# Patient Record
Sex: MALE | Race: WHITE | NOT HISPANIC OR LATINO | URBAN - METROPOLITAN AREA
[De-identification: names, ages, dates, MRNs, and addresses within clinical notes are randomized per-mention and may not be internally consistent; named-entity substitution may affect disease eponyms.]

---

## 2024-02-23 ENCOUNTER — HOSPITAL ENCOUNTER (OUTPATIENT)
Dept: RADIOLOGY | Facility: CLINIC | Age: 44
Discharge: HOME | End: 2024-02-23
Payer: COMMERCIAL

## 2024-02-23 ENCOUNTER — OFFICE VISIT (OUTPATIENT)
Dept: ORTHOPEDIC SURGERY | Facility: CLINIC | Age: 44
End: 2024-02-23
Payer: COMMERCIAL

## 2024-02-23 DIAGNOSIS — M25.571 BILATERAL ANKLE JOINT PAIN: ICD-10-CM

## 2024-02-23 DIAGNOSIS — M76.72 PERONEAL TENDINITIS OF BOTH LOWER LEGS: ICD-10-CM

## 2024-02-23 DIAGNOSIS — M25.572 BILATERAL ANKLE JOINT PAIN: ICD-10-CM

## 2024-02-23 DIAGNOSIS — M76.71 PERONEAL TENDINITIS OF BOTH LOWER LEGS: ICD-10-CM

## 2024-02-23 PROCEDURE — 73610 X-RAY EXAM OF ANKLE: CPT | Mod: 50

## 2024-02-23 PROCEDURE — 99213 OFFICE O/P EST LOW 20 MIN: CPT | Performed by: ORTHOPAEDIC SURGERY

## 2024-02-23 PROCEDURE — 1036F TOBACCO NON-USER: CPT | Performed by: ORTHOPAEDIC SURGERY

## 2024-02-23 PROCEDURE — 73610 X-RAY EXAM OF ANKLE: CPT | Mod: BILATERAL PROCEDURE | Performed by: RADIOLOGY

## 2024-02-23 PROCEDURE — 99203 OFFICE O/P NEW LOW 30 MIN: CPT | Performed by: ORTHOPAEDIC SURGERY

## 2024-02-23 NOTE — PROGRESS NOTES
History: Noe is here for his ankles.  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  There was a subsequent infection with cleanout and wound VAC.  He has gone on to heal well but has developed more significant pain on the outside of both ankles.  He has not had any surgery on the right ankle.  He is having more pain and trouble doing daily activities including walking and sports.    Past medical history: Multiple  Medications: Multiple  Allergies: Naproxen, Bactrim, vancomycin    Please refer to the intake H&P regarding the patient's review of systems, family history and social history as was done today    HEENT: Normal  Lungs: Clear to auscultation  Heart: RRR  Abdomen: Soft, nontender  Skin: clear  Extremity: He has tenderness around the peroneal tendons on both sides.  Previous scar on the left ankle is actually fairly well-healed.  He has trouble with eversion maneuvers due to his prior fusion with only mild pain.  He has 5-5 strength in all groups tested.  There is an obvious flatfoot deformity upon standing which corrects only slightly on the right and not on the left.  He has some numbness tingling around the incision on the left side as well.  Contralateral exam is normal for strength, motion, stability and neurovascular assessment.    Radiographs: X-rays today show a stable appearing subtalar fusion with single screw on the left.  Ankle mortise on both sides is maintained.    Assessment: Bilateral peroneal tendinitis with prior left subtalar fusion and history of bilateral talar calcaneal coalitions    Plan: He has a complicated history especially on the left side with his surgery and infection.  He developing more peroneal tendinitis and also has a bit of a flatfoot deformity.  I have urged him to get into a custom orthotic to help with his arches and alignment as this may significantly alleviate some of his discomfort.  He has never had MRIs of his  ankles since being diagnosed with the peroneal tendinitis on ultrasound and we will order the MRIs on both sides as well.  He is going to follow-up with our foot and ankle specialist to go through results and consider further options.  I would certainly urge him to avoid surgery if at all possible however they can discuss options depending on his clinical response and MRI results.  All questions were answered today with the patient.    This note was generated with voice recognition software and may contain grammatical errors.

## 2024-03-26 ENCOUNTER — APPOINTMENT (OUTPATIENT)
Dept: RADIOLOGY | Facility: CLINIC | Age: 44
End: 2024-03-26
Payer: COMMERCIAL

## 2024-04-10 ENCOUNTER — APPOINTMENT (OUTPATIENT)
Dept: ORTHOPEDIC SURGERY | Facility: CLINIC | Age: 44
End: 2024-04-10
Payer: COMMERCIAL

## 2024-04-23 ENCOUNTER — TRANSCRIBE ORDERS (OUTPATIENT)
Dept: ADMINISTRATIVE | Age: 44
End: 2024-04-23

## 2024-04-23 DIAGNOSIS — M76.72 PERONEAL TENDINITIS OF LEFT LOWER EXTREMITY: ICD-10-CM

## 2024-04-23 DIAGNOSIS — M76.71 PERONEAL TENDINITIS OF RIGHT LOWER EXTREMITY: Primary | ICD-10-CM

## 2024-05-06 ENCOUNTER — HOSPITAL ENCOUNTER (OUTPATIENT)
Dept: MRI IMAGING | Age: 44
Discharge: HOME | End: 2024-05-08
Attending: ORTHOPAEDIC SURGERY
Payer: COMMERCIAL

## 2024-05-06 DIAGNOSIS — M76.71 PERONEAL TENDINITIS OF RIGHT LOWER EXTREMITY: ICD-10-CM

## 2024-05-06 DIAGNOSIS — M76.72 PERONEAL TENDINITIS OF LEFT LOWER EXTREMITY: ICD-10-CM

## 2024-05-06 PROCEDURE — 73721 MRI JNT OF LWR EXTRE W/O DYE: CPT

## 2024-05-08 ENCOUNTER — OFFICE VISIT (OUTPATIENT)
Dept: ORTHOPEDIC SURGERY | Facility: CLINIC | Age: 44
End: 2024-05-08
Payer: COMMERCIAL

## 2024-05-08 VITALS — HEIGHT: 69 IN | BODY MASS INDEX: 25.18 KG/M2 | WEIGHT: 170 LBS

## 2024-05-08 DIAGNOSIS — M76.71 PERONEAL TENDINITIS OF BOTH LOWER LEGS: ICD-10-CM

## 2024-05-08 DIAGNOSIS — Q66.89 TARSAL COALITION OF BOTH FEET: Primary | ICD-10-CM

## 2024-05-08 DIAGNOSIS — M76.72 PERONEAL TENDINITIS OF BOTH LOWER LEGS: ICD-10-CM

## 2024-05-08 PROCEDURE — 99214 OFFICE O/P EST MOD 30 MIN: CPT | Performed by: ORTHOPAEDIC SURGERY

## 2024-05-08 PROCEDURE — 1036F TOBACCO NON-USER: CPT | Performed by: ORTHOPAEDIC SURGERY

## 2024-05-08 ASSESSMENT — PAIN SCALES - GENERAL: PAINLEVEL_OUTOF10: 4

## 2024-05-08 ASSESSMENT — PAIN DESCRIPTION - DESCRIPTORS: DESCRIPTORS: ACHING;THROBBING

## 2024-05-08 ASSESSMENT — PAIN - FUNCTIONAL ASSESSMENT: PAIN_FUNCTIONAL_ASSESSMENT: 0-10

## 2024-05-08 NOTE — PROGRESS NOTES
43-year-old male here for bilateral ankle pain.  Longstanding issue with both ankles.  Had a subtalar fusion on the left for tarsal coalition 2010 in [place].  2 years later had a peroneal tendon repair.  Had some sort of wound complication with the wound VAC.  Did okay for a few years until before 5 years ago developed pain over the lateral ankle bilaterally.  Was seen at the [facility].  Had ultrasound and MRI.  Did cortisone shots and PT.  Never really got better.  Feels like the right side wants to give out.  Non-smoker.  No diabetes.    On exam:  WD/WN athletic male  A+O X3  NAD  No lymphedema  Inspection of both feet and ankles show symmetric slight planus arches.   Able to STR bilaterally.  No inversion movement of the calcaneus.  5/5 strength in all 4 planes.  Tender over left peroneal tendon behind the fibula distally.  Has healed posterior lateral scar and sinus Tarsi scar on the left.  Minimal subtalar motion right hindfoot.  Point tender sinus Tarsi behind peroneals.  Sensation intact to LT.   Good pulses.   Stable anterior drawer.  No peroneal subluxation.    (-) Walter.     I personally reviewed the following radiographic exams: Nonweightbearing x-rays of both ankles shows previous subtalar fusion with screw on the left.  No acute changes bilaterally.  MRI report from outside facility recently shows apparent split tear of the left peroneal tendon distally with subtalar fusion.  Normal right ankle MRI.  No comment on subtalar coalition.    Assessment: Split tear left distal peroneal tendon status post subtalar fusion.  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    Plan: Discussed nonoperative and operative options in detail.   Risk and benefits discussed in detail. All questions answered today.  Recovery timeline and expectations discussed in detail.  Would like to review these MRIs in person.  We can consider surgical option for the peroneal tendon.  Would likely need a subtalar fusion on the right  based on clinical exam but would like to review the MRI.  Consider CT scan.

## 2024-05-22 ENCOUNTER — OFFICE VISIT (OUTPATIENT)
Dept: ORTHOPEDIC SURGERY | Facility: CLINIC | Age: 44
End: 2024-05-22
Payer: COMMERCIAL

## 2024-05-22 DIAGNOSIS — Q66.89 TARSAL COALITION OF BOTH FEET: Primary | ICD-10-CM

## 2024-05-22 DIAGNOSIS — M76.70 PERONEAL TENDINITIS, UNSPECIFIED LATERALITY: ICD-10-CM

## 2024-05-22 DIAGNOSIS — M25.571 SINUS TARSI SYNDROME OF BOTH ANKLES: ICD-10-CM

## 2024-05-22 DIAGNOSIS — M25.572 SINUS TARSI SYNDROME OF BOTH ANKLES: ICD-10-CM

## 2024-05-22 PROCEDURE — 20605 DRAIN/INJ JOINT/BURSA W/O US: CPT | Performed by: ORTHOPAEDIC SURGERY

## 2024-05-22 PROCEDURE — 99214 OFFICE O/P EST MOD 30 MIN: CPT | Performed by: ORTHOPAEDIC SURGERY

## 2024-05-22 RX ADMIN — METHYLPREDNISOLONE ACETATE 40 MG: 40 INJECTION, SUSPENSION INTRA-ARTICULAR; INTRALESIONAL; INTRAMUSCULAR; SOFT TISSUE at 09:25

## 2024-05-22 RX ADMIN — LIDOCAINE HYDROCHLORIDE 1 ML: 20 INJECTION, SOLUTION INFILTRATION; PERINEURAL at 09:25

## 2024-05-22 NOTE — PROGRESS NOTES
Returns for both ankles.  Has had his MRIs uploaded to PACS.    Exam: Point tenderness sinus Tarsi right hindfoot.  Tender over left ankle peroneal tendons and sinus Tarsi.    I personally reviewed the following radiographic exams: MRI of right hindfoot shows middle facet coalition.  Some edema along the posterior facet.  Normal peroneal tendons.  MRI left ankle shows irregularity postsurgical change of peroneal tendons.  Metal artifact across subtalar joint with apparent intact posterior facet.    Assessment: Right tarsal coalition with subtalar changes.  Possible nonunion left subtalar joint with peroneal tendon dysfunction.    Plan: Discussed nonoperative and operative options in detail.   Risk and benefits discussed in detail. All questions answered today.  Recovery timeline and expectations discussed in detail.  Discussed injection right subtalar joint for diagnostic therapeutic purposes.  Consider subtalar fusion in the future.  Recommend CT scan of left hindfoot for better bony definition of the subtalar joint.  Discussed possible exploration peroneal tendons and possible revision fusion subtalar joint.  After informed consent under sterile conditions the right subtalar joint was injected with a combination of  1cc 2% lidocaine and 40mg Methylprednisolone.  Risks and benefits were discussed in detail.    Patient ID: Noe Avilez is a 43 y.o. male.    M Inj/Asp: R subtalar on 5/22/2024 9:25 AM  Indications: pain  Details: 22 G needle, anterior approach  Medications: 40 mg methylPREDNISolone acetate 40 mg/mL; 1 mL lidocaine 20 mg/mL (2 %)  Procedure, treatment alternatives, risks and benefits explained, specific risks discussed. Consent was given by the patient. Immediately prior to procedure a time out was called to verify the correct patient, procedure, equipment, support staff and site/side marked as required. Patient was prepped and draped in the usual sterile fashion.

## 2024-05-23 RX ORDER — LIDOCAINE HYDROCHLORIDE 20 MG/ML
1 INJECTION, SOLUTION INFILTRATION; PERINEURAL
Status: COMPLETED | OUTPATIENT
Start: 2024-05-22 | End: 2024-05-22

## 2024-05-23 RX ORDER — METHYLPREDNISOLONE ACETATE 40 MG/ML
40 INJECTION, SUSPENSION INTRA-ARTICULAR; INTRALESIONAL; INTRAMUSCULAR; SOFT TISSUE
Status: COMPLETED | OUTPATIENT
Start: 2024-05-22 | End: 2024-05-22

## 2024-06-05 ENCOUNTER — HOSPITAL ENCOUNTER (OUTPATIENT)
Dept: RADIOLOGY | Facility: CLINIC | Age: 44
Discharge: HOME | End: 2024-06-05
Payer: COMMERCIAL

## 2024-06-05 DIAGNOSIS — Q66.89 TARSAL COALITION OF BOTH FEET: ICD-10-CM

## 2024-06-05 PROCEDURE — 73700 CT LOWER EXTREMITY W/O DYE: CPT | Mod: LEFT SIDE | Performed by: INTERNAL MEDICINE

## 2024-06-05 PROCEDURE — 73700 CT LOWER EXTREMITY W/O DYE: CPT | Mod: LT

## 2024-06-12 ENCOUNTER — APPOINTMENT (OUTPATIENT)
Dept: ORTHOPEDIC SURGERY | Facility: CLINIC | Age: 44
End: 2024-06-12
Payer: COMMERCIAL

## 2024-06-12 DIAGNOSIS — M76.72 PERONEAL TENDINITIS OF BOTH LOWER LEGS: Primary | ICD-10-CM

## 2024-06-12 DIAGNOSIS — Q66.89 TARSAL COALITION OF BOTH FEET: ICD-10-CM

## 2024-06-12 DIAGNOSIS — M76.71 PERONEAL TENDINITIS OF BOTH LOWER LEGS: Primary | ICD-10-CM

## 2024-06-12 PROCEDURE — 1036F TOBACCO NON-USER: CPT | Performed by: ORTHOPAEDIC SURGERY

## 2024-06-12 PROCEDURE — 99214 OFFICE O/P EST MOD 30 MIN: CPT | Performed by: ORTHOPAEDIC SURGERY

## 2024-06-12 RX ORDER — CEFAZOLIN SODIUM 2 G/100ML
2 INJECTION, SOLUTION INTRAVENOUS ONCE
OUTPATIENT
Start: 2024-06-12 | End: 2024-06-12

## 2024-06-12 RX ORDER — SODIUM CHLORIDE, SODIUM LACTATE, POTASSIUM CHLORIDE, CALCIUM CHLORIDE 600; 310; 30; 20 MG/100ML; MG/100ML; MG/100ML; MG/100ML
100 INJECTION, SOLUTION INTRAVENOUS CONTINUOUS
OUTPATIENT
Start: 2024-06-12

## 2024-06-12 NOTE — PROGRESS NOTES
Returns for CT scan of left ankle.  Got minimal relief with the injection last visit on the right.    Exam: Unchanged.    I personally reviewed the following radiographic exams: Left ankle shows posterior facet fusion at the lateral process across the screw fixation site.  Still intact posterior facet medially.  Solid middle facet fusion/coalition.    Assessment: Right middle facet coalition.  Left subtalar fusion with distal peroneal tendon tear.    Plan: Discussed nonoperative and operative options in detail.   Risk and benefits discussed in detail. All questions answered today.  Recovery timeline and expectations discussed in detail.  Discussed possible subtalar fusion on the right in the future.  Discussed peroneal tendon repair and probable tenodesis with hardware removal from the left heel.  Discussed postop recovery.  Left peroneal tendon repair possible tenodesis 82650/23918.  Left heel hard removal 20680.  General and regional.  Universal hardware removal set.  C arm.  Crutches.  30 minutes.

## 2024-07-01 PROBLEM — M76.72 PERONEAL TENDINITIS OF BOTH LOWER LEGS: Status: ACTIVE | Noted: 2024-06-12

## 2024-07-01 PROBLEM — M76.71 PERONEAL TENDINITIS OF BOTH LOWER LEGS: Status: ACTIVE | Noted: 2024-06-12

## 2024-07-01 PROBLEM — Q66.89: Status: ACTIVE | Noted: 2024-06-12

## 2024-07-23 ENCOUNTER — CLINICAL SUPPORT (OUTPATIENT)
Dept: PREADMISSION TESTING | Facility: HOSPITAL | Age: 44
End: 2024-07-23
Payer: COMMERCIAL

## 2024-07-23 RX ORDER — IBUPROFEN 400 MG/1
400 TABLET ORAL EVERY 6 HOURS PRN
COMMUNITY

## 2024-07-25 ENCOUNTER — PRE-ADMISSION TESTING (OUTPATIENT)
Dept: PREADMISSION TESTING | Facility: HOSPITAL | Age: 44
End: 2024-07-25
Payer: COMMERCIAL

## 2024-07-25 ENCOUNTER — LAB (OUTPATIENT)
Dept: LAB | Facility: LAB | Age: 44
End: 2024-07-25
Payer: COMMERCIAL

## 2024-07-25 VITALS
TEMPERATURE: 97 F | WEIGHT: 174.38 LBS | OXYGEN SATURATION: 100 % | RESPIRATION RATE: 16 BRPM | HEART RATE: 62 BPM | BODY MASS INDEX: 25.83 KG/M2 | SYSTOLIC BLOOD PRESSURE: 132 MMHG | HEIGHT: 69 IN | DIASTOLIC BLOOD PRESSURE: 85 MMHG

## 2024-07-25 DIAGNOSIS — M76.71 PERONEAL TENDINITIS OF BOTH LOWER LEGS: ICD-10-CM

## 2024-07-25 DIAGNOSIS — M76.72 PERONEAL TENDINITIS OF BOTH LOWER LEGS: ICD-10-CM

## 2024-07-25 DIAGNOSIS — Q66.89 TARSAL COALITION OF BOTH FEET: ICD-10-CM

## 2024-07-25 DIAGNOSIS — Z01.818 PREOP TESTING: Primary | ICD-10-CM

## 2024-07-25 DIAGNOSIS — Z01.818 PREOP TESTING: ICD-10-CM

## 2024-07-25 LAB
ANION GAP SERPL CALC-SCNC: 13 MMOL/L (ref 10–20)
BASOPHILS # BLD AUTO: 0.04 X10*3/UL (ref 0–0.1)
BASOPHILS NFR BLD AUTO: 0.7 %
BUN SERPL-MCNC: 14 MG/DL (ref 6–23)
CALCIUM SERPL-MCNC: 9.1 MG/DL (ref 8.6–10.3)
CHLORIDE SERPL-SCNC: 101 MMOL/L (ref 98–107)
CO2 SERPL-SCNC: 26 MMOL/L (ref 21–32)
CREAT SERPL-MCNC: 1.09 MG/DL (ref 0.5–1.3)
EGFRCR SERPLBLD CKD-EPI 2021: 85 ML/MIN/1.73M*2
EOSINOPHIL # BLD AUTO: 0.25 X10*3/UL (ref 0–0.7)
EOSINOPHIL NFR BLD AUTO: 4.3 %
ERYTHROCYTE [DISTWIDTH] IN BLOOD BY AUTOMATED COUNT: 13 % (ref 11.5–14.5)
GLUCOSE SERPL-MCNC: 93 MG/DL (ref 74–99)
HCT VFR BLD AUTO: 43.8 % (ref 41–52)
HGB BLD-MCNC: 14.7 G/DL (ref 13.5–17.5)
IMM GRANULOCYTES # BLD AUTO: 0.02 X10*3/UL (ref 0–0.7)
IMM GRANULOCYTES NFR BLD AUTO: 0.3 % (ref 0–0.9)
LYMPHOCYTES # BLD AUTO: 1.59 X10*3/UL (ref 1.2–4.8)
LYMPHOCYTES NFR BLD AUTO: 27.1 %
MCH RBC QN AUTO: 29.1 PG (ref 26–34)
MCHC RBC AUTO-ENTMCNC: 33.6 G/DL (ref 32–36)
MCV RBC AUTO: 87 FL (ref 80–100)
MONOCYTES # BLD AUTO: 0.51 X10*3/UL (ref 0.1–1)
MONOCYTES NFR BLD AUTO: 8.7 %
NEUTROPHILS # BLD AUTO: 3.46 X10*3/UL (ref 1.2–7.7)
NEUTROPHILS NFR BLD AUTO: 58.9 %
NRBC BLD-RTO: 0 /100 WBCS (ref 0–0)
PLATELET # BLD AUTO: 287 X10*3/UL (ref 150–450)
POTASSIUM SERPL-SCNC: 4.1 MMOL/L (ref 3.5–5.3)
RBC # BLD AUTO: 5.05 X10*6/UL (ref 4.5–5.9)
SODIUM SERPL-SCNC: 136 MMOL/L (ref 136–145)
WBC # BLD AUTO: 5.9 X10*3/UL (ref 4.4–11.3)

## 2024-07-25 PROCEDURE — 85025 COMPLETE CBC W/AUTO DIFF WBC: CPT

## 2024-07-25 PROCEDURE — 80048 BASIC METABOLIC PNL TOTAL CA: CPT

## 2024-07-25 PROCEDURE — 36415 COLL VENOUS BLD VENIPUNCTURE: CPT

## 2024-07-25 ASSESSMENT — ENCOUNTER SYMPTOMS
NEUROLOGICAL NEGATIVE: 1
CONSTITUTIONAL NEGATIVE: 1
ENDOCRINE NEGATIVE: 1
EYES NEGATIVE: 1
GASTROINTESTINAL NEGATIVE: 1
ARTHRALGIAS: 1
RESPIRATORY NEGATIVE: 1
CARDIOVASCULAR NEGATIVE: 1
NECK NEGATIVE: 1

## 2024-07-25 NOTE — CPM/PAT NURSE NOTE
CPM/PAT Nurse Note      Name: Noe Avilez (Noe Avilez)  /Age: 1980/44 y.o.       Past Medical History:   Diagnosis Date    Anxiety     mproved per patient    Flat foot     Fracture of ankle     HLD (hyperlipidemia)     Joint pain     bilateral ankles    Peroneal tendonitis     bilateral LE's    Tarsal coalition of both feet     Vitamin D deficiency     not on supplement       Past Surgical History:   Procedure Laterality Date    ANKLE FRACTURE SURGERY Left 2009    fusion of left ankle joint    LEG TENDON SURGERY Left 2011    ruptured tendon repair    LIPOMA RESECTION  2024    chest    VASECTOMY         Patient  reports being sexually active and has had partner(s) who are female. He reports using the following method of birth control/protection: Surgical.    Family History   Problem Relation Name Age of Onset    Other (HLD) Mother      Sarcoidosis Father      No Known Problems Sister      Cancer Maternal Grandfather Edi Avilez     Cancer Mother's Sister Kassi García        Allergies   Allergen Reactions    Naproxen GI Upset    Sulfamethoxazole-Trimethoprim Unknown     PT HAD A MIGRAINE    Vancomycin Hives and Itching       Prior to Admission medications    Medication Sig Start Date End Date Taking? Authorizing Provider   ibuprofen 400 mg tablet Take 1 tablet (400 mg) by mouth every 6 hours if needed for moderate pain (4 - 6).    Historical Provider, MD   loratadine (Claritin) 5 mg chewable tablet Chew 1 tablet (5 mg) if needed for allergies.    Historical Provider, MD ALLISON BLEVINS     DASI Risk Score    No data to display       Caprini DVT Assessment    No data to display       Modified Frailty Index    No data to display       CHADS2 Stroke Risk         N/A 3 to 100%: High Risk   2 to < 3%: Medium Risk   0 to < 2%: Low Risk     Last Change: N/A          This score determines the patient's risk of having a stroke if the patient has atrial fibrillation.        This score is not applicable to  this patient. Components are not calculated.          Revised Cardiac Risk Index    No data to display       Apfel Simplified Score    No data to display       Risk Analysis Index Results This Encounter    No data found in the last 1 encounters.         Nurse Plan of Action:   .gen

## 2024-07-25 NOTE — CPM/PAT H&P
[facility]/PAT Evaluation       Name: Noe Avilez (Noe Avilez)  /Age: 1980/44 y.o.     In-Person         Date of Consult: 24    Referring Provider: Dr. Foley     Date, Surgery, and Length: 24, left ankle tendon repair, with left lower extremity hardware removal, 90 minutes      [facility] presents to [facility] for perioperative risk assessment prior to scheduled surgery. Patient presents with right middle facet coalition. Left subtalar fusion with distal peroneal tendon tear. He initially presented with  bilateral ankle pain. Longstanding issue with both ankles. Had a subtalar fusion on the left for tarsal coalition  in [place]. 2 years later had a peroneal tendon repair. Had some sort of wound complication with the wound VAC. Did okay for a few years until before 5 years ago developed pain over the lateral ankle bilaterally. Was seen at the [facility]. Had ultrasound and MRI. Did cortisone shots and PT. Never really got better. Feels like the right side wants to give out.     This note was created in part upon personal review of patient's medical records.      Pt denies any past history of anesthetic complications such as PONV, awareness, prolonged sedation, dental damage, aspiration, cardiac arrest, difficult intubation, difficult I.V. access or unexpected hospital admissions.  No history of malignant hyperthermia and or pseudocholinesterase deficiency.    No history of blood transfusions.    The patient is not a [facility] and will accept blood and blood products if medically indicated. Type and screen not sent.      Past Medical History:   Diagnosis Date    Anxiety     mproved per patient    Flat foot     Fracture of ankle     HLD (hyperlipidemia)     Joint pain     bilateral ankles    Peroneal tendonitis     bilateral LE's    Tarsal coalition of both feet     Vitamin D deficiency     not on supplement       Past Surgical History:   Procedure Laterality Date    ANKLE FRACTURE SURGERY  "Left 2009    fusion of left ankle joint    LEG TENDON SURGERY Left 2011    ruptured tendon repair    LIPOMA RESECTION  01/2024    chest    VASECTOMY  2011       Patient  reports being sexually active and has had partner(s) who are female. He reports using the following method of birth control/protection: Surgical.    Family History   Problem Relation Name Age of Onset    Other (HLD) Mother      Sarcoidosis Father      No Known Problems Sister      Cancer Maternal Grandfather Edi Avilez     Cancer Mother's Sister Kassi García        Allergies   Allergen Reactions    Naproxen GI Upset    Sulfamethoxazole-Trimethoprim Unknown     PT HAD A MIGRAINE    Vancomycin Hives and Itching     Social History     Tobacco Use   Smoking Status Never   Smokeless Tobacco Never     Social History     Substance and Sexual Activity   Alcohol Use Not Currently    Comment: 1 drink maybe every few weeks     Social History     Substance and Sexual Activity   Drug Use Never       Current Outpatient Medications:     ibuprofen 400 mg tablet, Take 1 tablet (400 mg) by mouth every 6 hours if needed for moderate pain (4 - 6)., Disp: , Rfl:     loratadine (Claritin) 5 mg chewable tablet, Chew 1 tablet (5 mg) if needed for allergies., Disp: , Rfl:        PAT ROS:   Constitutional:   neg    Neuro/Psych:   neg    Eyes:   neg    Ears:   neg    Nose:   neg    Mouth:    Upper permanent retainer  Throat:   neg    Neck:   neg    Cardio:   neg    Respiratory:   neg    Endocrine:   neg    GI:   neg    :   neg    Musculoskeletal:    arthralgias  Hematologic:   neg    Skin:  neg        Physical Exam  Vitals reviewed. Physical exam within normal limits.          PAT AIRWAY:   Airway:     Mallampati::  II    Neck ROM::  Full   No broken teeth, no dentures and no missing teeth        Visit Vitals  /85   Pulse 62   Temp 36.1 °C (97 °F)   Resp 16   Ht 1.753 m (5' 9\")   Wt 79.1 kg (174 lb 6.1 oz)   SpO2 100%   BMI 25.75 kg/m²   Smoking Status Never   BSA " 1.96 m²       Assessment and Plan:     Patient is a 44 year old generally healthy  male scheduled for left ankle tendon repair with left lower extremity hardware removal on 8/1/24 with Dr. Foley.    Patient is at acceptable risk to proceed with planned surgical procedure. Further cardiac risk stratification deferred at this time.This patient is a low risk candidate undergoing moderate risk procedure, patient is medically optimized for surgery.    Plan    Cardiovascular:    Patient denies any chest pain, tightness, heaviness, pressure, radiating pain, palpitations, irregular heartbeats, lightheadedness, cough, congestion, shortness of breath, KOLB, PND, near syncope, weight loss or gain.    Good  functional capacity  Functional 4 Mets. Patient denies SOB walking up 2 flights of stairs    EKG in PAT not indicated.    RCRI: 0  Risk of Mace: 3.9%    Pulm:  Known or suspected LARA is considered an independent risk factor for difficult mask ventilation, difficult intubation or both.  Increased vigilance is recommended with the use of narcotics due to an increased risk for opioid induced respiratory depression.  The patient may benefit from continuous pulse oximetry to monitor for hypoxic events until baseline Sp02 is normal on room air.    Stop Bang= 1, male    Renal/endo:  Recommendations to avoid nephrotoxic drugs and carefully monitor fluid status to maintain euvolemia. Use dose adjusted medications as needed for the underlying level of renal function.    Heme:  Patient instructed to ambulate as soon as possible postoperatively to decrease thromboembolic risk.    Initiate mechanical DVT prophylaxis as soon as possible and initiate chemical prophylaxis when deemed safe from a bleeding standpoint post surgery.    Caprini= 4      Risk assessment complete.  Patient is scheduled for a low surgical risk procedure. He is considered medically optimized for the planned procedure.    Labs/testing obtained in PAT on  7/25/24: CBC,BMP    Lab Results   Component Value Date    WBC 5.9 07/25/2024    HGB 14.7 07/25/2024    HCT 43.8 07/25/2024    MCV 87 07/25/2024     07/25/2024     Lab Results   Component Value Date    GLUCOSE 93 07/25/2024    CALCIUM 9.1 07/25/2024     07/25/2024    K 4.1 07/25/2024    CO2 26 07/25/2024     07/25/2024    BUN 14 07/25/2024    CREATININE 1.09 07/25/2024         Follow up: none      Preoperative medication instructions were provided and reviewed with the patient.  Any additional testing or evaluation was explained to the patient.  Nothing by mouth instructions were discussed and patient's questions were answered prior to conclusion to this encounter.  Patient verbalized understanding of preoperative instructions given in preadmission testing; discharge instructions available in EMR.    This note was dictated with speech recognition.  Minor errors may have been detected during use of speech recognition.

## 2024-07-25 NOTE — H&P (VIEW-ONLY)
[facility]/PAT Evaluation       Name: Noe Avilez (Noe Avilez)  /Age: 1980/44 y.o.     In-Person         Date of Consult: 24    Referring Provider: Dr. Foley     Date, Surgery, and Length: 24, left ankle tendon repair, with left lower extremity hardware removal, 90 minutes      [facility] presents to [facility] for perioperative risk assessment prior to scheduled surgery. Patient presents with right middle facet coalition. Left subtalar fusion with distal peroneal tendon tear. He initially presented with  bilateral ankle pain. Longstanding issue with both ankles. Had a subtalar fusion on the left for tarsal coalition  in [place]. 2 years later had a peroneal tendon repair. Had some sort of wound complication with the wound VAC. Did okay for a few years until before 5 years ago developed pain over the lateral ankle bilaterally. Was seen at the [facility]. Had ultrasound and MRI. Did cortisone shots and PT. Never really got better. Feels like the right side wants to give out.     This note was created in part upon personal review of patient's medical records.      Pt denies any past history of anesthetic complications such as PONV, awareness, prolonged sedation, dental damage, aspiration, cardiac arrest, difficult intubation, difficult I.V. access or unexpected hospital admissions.  No history of malignant hyperthermia and or pseudocholinesterase deficiency.    No history of blood transfusions.    The patient is not a [employer] and will accept blood and blood products if medically indicated. Type and screen not sent.      Past Medical History:   Diagnosis Date    Anxiety     mproved per patient    Flat foot     Fracture of ankle     HLD (hyperlipidemia)     Joint pain     bilateral ankles    Peroneal tendonitis     bilateral LE's    Tarsal coalition of both feet     Vitamin D deficiency     not on supplement       Past Surgical History:   Procedure Laterality Date    ANKLE FRACTURE SURGERY  "Left 2009    fusion of left ankle joint    LEG TENDON SURGERY Left 2011    ruptured tendon repair    LIPOMA RESECTION  01/2024    chest    VASECTOMY  2011       Patient  reports being sexually active and has had partner(s) who are female. He reports using the following method of birth control/protection: Surgical.    Family History   Problem Relation Name Age of Onset    Other (HLD) Mother      Sarcoidosis Father      No Known Problems Sister      Cancer Maternal Grandfather Edi Avilez     Cancer Mother's Sister Kassi García        Allergies   Allergen Reactions    Naproxen GI Upset    Sulfamethoxazole-Trimethoprim Unknown     PT HAD A MIGRAINE    Vancomycin Hives and Itching     Social History     Tobacco Use   Smoking Status Never   Smokeless Tobacco Never     Social History     Substance and Sexual Activity   Alcohol Use Not Currently    Comment: 1 drink maybe every few weeks     Social History     Substance and Sexual Activity   Drug Use Never       Current Outpatient Medications:     ibuprofen 400 mg tablet, Take 1 tablet (400 mg) by mouth every 6 hours if needed for moderate pain (4 - 6)., Disp: , Rfl:     loratadine (Claritin) 5 mg chewable tablet, Chew 1 tablet (5 mg) if needed for allergies., Disp: , Rfl:        PAT ROS:   Constitutional:   neg    Neuro/Psych:   neg    Eyes:   neg    Ears:   neg    Nose:   neg    Mouth:    Upper permanent retainer  Throat:   neg    Neck:   neg    Cardio:   neg    Respiratory:   neg    Endocrine:   neg    GI:   neg    :   neg    Musculoskeletal:    arthralgias  Hematologic:   neg    Skin:  neg        Physical Exam  Vitals reviewed. Physical exam within normal limits.          PAT AIRWAY:   Airway:     Mallampati::  II    Neck ROM::  Full   No broken teeth, no dentures and no missing teeth        Visit Vitals  /85   Pulse 62   Temp 36.1 °C (97 °F)   Resp 16   Ht 1.753 m (5' 9\")   Wt 79.1 kg (174 lb 6.1 oz)   SpO2 100%   BMI 25.75 kg/m²   Smoking Status Never   BSA " 1.96 m²       Assessment and Plan:     Patient is a 44 year old generally healthy  male scheduled for left ankle tendon repair with left lower extremity hardware removal on 8/1/24 with Dr. Foley.    Patient is at acceptable risk to proceed with planned surgical procedure. Further cardiac risk stratification deferred at this time.This patient is a low risk candidate undergoing moderate risk procedure, patient is medically optimized for surgery.    Plan    Cardiovascular:    Patient denies any chest pain, tightness, heaviness, pressure, radiating pain, palpitations, irregular heartbeats, lightheadedness, cough, congestion, shortness of breath, KOLB, PND, near syncope, weight loss or gain.    Good  functional capacity  Functional 4 Mets. Patient denies SOB walking up 2 flights of stairs    EKG in PAT not indicated.    RCRI: 0  Risk of Mace: 3.9%    Pulm:  Known or suspected LARA is considered an independent risk factor for difficult mask ventilation, difficult intubation or both.  Increased vigilance is recommended with the use of narcotics due to an increased risk for opioid induced respiratory depression.  The patient may benefit from continuous pulse oximetry to monitor for hypoxic events until baseline Sp02 is normal on room air.    Stop Bang= 1, male    Renal/endo:  Recommendations to avoid nephrotoxic drugs and carefully monitor fluid status to maintain euvolemia. Use dose adjusted medications as needed for the underlying level of renal function.    Heme:  Patient instructed to ambulate as soon as possible postoperatively to decrease thromboembolic risk.    Initiate mechanical DVT prophylaxis as soon as possible and initiate chemical prophylaxis when deemed safe from a bleeding standpoint post surgery.    Caprini= 4      Risk assessment complete.  Patient is scheduled for a low surgical risk procedure. He is considered medically optimized for the planned procedure.    Labs/testing obtained in PAT on  7/25/24: CBC,BMP    Lab Results   Component Value Date    WBC 5.9 07/25/2024    HGB 14.7 07/25/2024    HCT 43.8 07/25/2024    MCV 87 07/25/2024     07/25/2024     Lab Results   Component Value Date    GLUCOSE 93 07/25/2024    CALCIUM 9.1 07/25/2024     07/25/2024    K 4.1 07/25/2024    CO2 26 07/25/2024     07/25/2024    BUN 14 07/25/2024    CREATININE 1.09 07/25/2024         Follow up: none      Preoperative medication instructions were provided and reviewed with the patient.  Any additional testing or evaluation was explained to the patient.  Nothing by mouth instructions were discussed and patient's questions were answered prior to conclusion to this encounter.  Patient verbalized understanding of preoperative instructions given in preadmission testing; discharge instructions available in EMR.    This note was dictated with speech recognition.  Minor errors may have been detected during use of speech recognition.

## 2024-07-25 NOTE — PREPROCEDURE INSTRUCTIONS
Medication List            Accurate as of July 25, 2024  1:50 PM. Always use your most recent med list.                ibuprofen 400 mg tablet  Medication Adjustments for Surgery: Stop 7 days before surgery     loratadine 5 mg chewable tablet  Commonly known as: Claritin  Medication Adjustments for Surgery: Stop 1 day before surgery            CONTACT SURGEON'S OFFICE IF YOU DEVELOP:  * Fever = 100.4 F   * New respiratory symptoms (e.g. cough, shortness of breath, respiratory distress, sore throat)  * Recent loss of taste or smell  *Flu like symptoms such as headache, fatigue or gastrointestinal symptoms  * You develop any open sores, shingles, burning or painful urination   AND/OR:  * You no longer wish to have the surgery.  * Any other personal circumstances change that may lead to the need to cancel or defer this surgery.  *You were admitted to any hospital within one week of your planned procedure.    SMOKING:  *Quitting smoking can make a huge difference to your health and recovery from surgery.    *If you need help with quitting, call 2-619-QUIT-NOW.    THE DAY BEFORE SURGERY:  *Do not eat any food after midnight the night before your surgery.   *You may have up to 13.5 OUNCES of clear liquids until TWO hours before your instructed ARRIVAL TIME to hospital. This includes water, black tea/coffee, (no milk or cream) apple juice, clear broth and electrolyte drinks (Gatorade). Please avoid clear liquids that are red in color.   *You may chew gum/mints up to TWO hours before your surgery/procedure.    SURGICAL TIME:  *You will be contacted between 2 p.m. and 6 p.m. the business day before your surgery with your arrival time.  *If you haven't received a call by 6pm, call [phone].  *Scheduled surgery times may change and you will be notified if this occurs-check your personal voicemail for any updates.    ON THE MORNING OF SURGERY:  *Wear comfortable, loose fitting clothing.   *Do not use moisturizers,  creams, lotions or perfume.  *All jewelry and valuables should be left at home.  *Prosthetic devices such as contact lenses, hearing aids, dentures, eyelash extensions, hairpins and body piercing must be removed before surgery.    BRING WITH YOU:  *Photo ID and insurance card  *Current list of medications and allergies  *Pacemaker/Defibrillator/Heart stent cards  *CPAP machine and mask  *Slings/splints/crutches  *Copy of your complete Advanced Directive/DHPOA-if applicable  *Neurostimulator implant remote    PARKING AND ARRIVAL:  *Check in at the Main Entrance desk and let them know you are here for surgery.  *You will be directed to the 2nd floor surgical waiting area.    IF YOU ARE HAVING OUTPATIENT/SAME DAY SURGERY:  *A responsible adult MUST accompany you at the time of discharge and stay with you for 24 hours after your surgery.  *You may NOT drive yourself home after surgery.  *You may use a taxi or ride sharing service ([employer]) to return home ONLY if you are accompanied by a friend or family member.  *Instructions for resuming your medications will be provided by your surgeon.                                                               (4) no impairment

## 2024-07-31 NOTE — DISCHARGE INSTRUCTIONS
Additional instructions  Ice/Elevate operative extremity.  Keep dressing clean and dry.    Keep dressing in place.  Weightbearing: NWB on operative extremity with crutches/walker.   Start Tylenol 650 mg by mouth every 6 hours today as needed for pain.  Oxycodone 1 by mouth every 6 hours as needed for pain when block wears off.  Aspirin 81 mg by mouth twice daily.  Meloxicam 7.5 mg daily for 14 days.  Colace twice a day for constipation.  F/U with Dr. Foley in 2 weeks.  Call [phone] for appointment time.

## 2024-08-01 ENCOUNTER — APPOINTMENT (OUTPATIENT)
Dept: RADIOLOGY | Facility: HOSPITAL | Age: 44
End: 2024-08-01
Payer: COMMERCIAL

## 2024-08-01 ENCOUNTER — ANESTHESIA EVENT (OUTPATIENT)
Dept: OPERATING ROOM | Facility: HOSPITAL | Age: 44
End: 2024-08-01
Payer: COMMERCIAL

## 2024-08-01 ENCOUNTER — ANESTHESIA (OUTPATIENT)
Dept: OPERATING ROOM | Facility: HOSPITAL | Age: 44
End: 2024-08-01
Payer: COMMERCIAL

## 2024-08-01 ENCOUNTER — HOSPITAL ENCOUNTER (OUTPATIENT)
Facility: HOSPITAL | Age: 44
Setting detail: OUTPATIENT SURGERY
Discharge: HOME | End: 2024-08-01
Attending: ORTHOPAEDIC SURGERY | Admitting: ORTHOPAEDIC SURGERY
Payer: COMMERCIAL

## 2024-08-01 VITALS
TEMPERATURE: 97 F | RESPIRATION RATE: 15 BRPM | OXYGEN SATURATION: 100 % | SYSTOLIC BLOOD PRESSURE: 121 MMHG | HEART RATE: 77 BPM | DIASTOLIC BLOOD PRESSURE: 87 MMHG

## 2024-08-01 DIAGNOSIS — M76.72 PERONEAL TENDINITIS OF BOTH LOWER LEGS: Primary | ICD-10-CM

## 2024-08-01 DIAGNOSIS — Q66.89 TARSAL COALITION OF BOTH FEET: ICD-10-CM

## 2024-08-01 DIAGNOSIS — T84.498A OTHER MECHANICAL COMPLICATION OF OTHER INTERNAL ORTHOPEDIC DEVICES, IMPLANTS AND GRAFTS, INITIAL ENCOUNTER (CMS-HCC): ICD-10-CM

## 2024-08-01 DIAGNOSIS — M76.71 PERONEAL TENDINITIS OF BOTH LOWER LEGS: Primary | ICD-10-CM

## 2024-08-01 PROCEDURE — 3600000004 HC OR TIME - INITIAL BASE CHARGE - PROCEDURE LEVEL FOUR: Performed by: ORTHOPAEDIC SURGERY

## 2024-08-01 PROCEDURE — 76000 FLUOROSCOPY <1 HR PHYS/QHP: CPT | Mod: LT

## 2024-08-01 PROCEDURE — 2500000005 HC RX 250 GENERAL PHARMACY W/O HCPCS

## 2024-08-01 PROCEDURE — 2500000001 HC RX 250 WO HCPCS SELF ADMINISTERED DRUGS (ALT 637 FOR MEDICARE OP): Performed by: STUDENT IN AN ORGANIZED HEALTH CARE EDUCATION/TRAINING PROGRAM

## 2024-08-01 PROCEDURE — 2500000004 HC RX 250 GENERAL PHARMACY W/ HCPCS (ALT 636 FOR OP/ED): Performed by: STUDENT IN AN ORGANIZED HEALTH CARE EDUCATION/TRAINING PROGRAM

## 2024-08-01 PROCEDURE — 7100000002 HC RECOVERY ROOM TIME - EACH INCREMENTAL 1 MINUTE: Performed by: ORTHOPAEDIC SURGERY

## 2024-08-01 PROCEDURE — 20680 REMOVAL OF IMPLANT DEEP: CPT | Performed by: ORTHOPAEDIC SURGERY

## 2024-08-01 PROCEDURE — 2500000004 HC RX 250 GENERAL PHARMACY W/ HCPCS (ALT 636 FOR OP/ED)

## 2024-08-01 PROCEDURE — A27658 PR REPAIR FLEX LEG TENDON,PRIM,EA: Performed by: ANESTHESIOLOGY

## 2024-08-01 PROCEDURE — 2780000003 HC OR 278 NO HCPCS: Performed by: ORTHOPAEDIC SURGERY

## 2024-08-01 PROCEDURE — C1713 ANCHOR/SCREW BN/BN,TIS/BN: HCPCS | Performed by: ORTHOPAEDIC SURGERY

## 2024-08-01 PROCEDURE — 64450 NJX AA&/STRD OTHER PN/BRANCH: CPT | Performed by: ANESTHESIOLOGY

## 2024-08-01 PROCEDURE — 27675 REPAIR LOWER LEG TENDONS: CPT | Performed by: ORTHOPAEDIC SURGERY

## 2024-08-01 PROCEDURE — A27658 PR REPAIR FLEX LEG TENDON,PRIM,EA

## 2024-08-01 PROCEDURE — 27658 REPAIR OF LEG TENDON EACH: CPT | Performed by: ORTHOPAEDIC SURGERY

## 2024-08-01 PROCEDURE — 3700000002 HC GENERAL ANESTHESIA TIME - EACH INCREMENTAL 1 MINUTE: Performed by: ORTHOPAEDIC SURGERY

## 2024-08-01 PROCEDURE — 7100000001 HC RECOVERY ROOM TIME - INITIAL BASE CHARGE: Performed by: ORTHOPAEDIC SURGERY

## 2024-08-01 PROCEDURE — 3600000009 HC OR TIME - EACH INCREMENTAL 1 MINUTE - PROCEDURE LEVEL FOUR: Performed by: ORTHOPAEDIC SURGERY

## 2024-08-01 PROCEDURE — 2500000004 HC RX 250 GENERAL PHARMACY W/ HCPCS (ALT 636 FOR OP/ED): Performed by: ORTHOPAEDIC SURGERY

## 2024-08-01 PROCEDURE — 7100000009 HC PHASE TWO TIME - INITIAL BASE CHARGE: Performed by: ORTHOPAEDIC SURGERY

## 2024-08-01 PROCEDURE — 3700000001 HC GENERAL ANESTHESIA TIME - INITIAL BASE CHARGE: Performed by: ORTHOPAEDIC SURGERY

## 2024-08-01 PROCEDURE — 7100000010 HC PHASE TWO TIME - EACH INCREMENTAL 1 MINUTE: Performed by: ORTHOPAEDIC SURGERY

## 2024-08-01 PROCEDURE — 2720000007 HC OR 272 NO HCPCS: Performed by: ORTHOPAEDIC SURGERY

## 2024-08-01 PROCEDURE — 2500000005 HC RX 250 GENERAL PHARMACY W/O HCPCS: Performed by: STUDENT IN AN ORGANIZED HEALTH CARE EDUCATION/TRAINING PROGRAM

## 2024-08-01 DEVICE — IMPLANTABLE DEVICE: Type: IMPLANTABLE DEVICE | Site: FOOT | Status: FUNCTIONAL

## 2024-08-01 RX ORDER — SODIUM CHLORIDE, SODIUM LACTATE, POTASSIUM CHLORIDE, CALCIUM CHLORIDE 600; 310; 30; 20 MG/100ML; MG/100ML; MG/100ML; MG/100ML
100 INJECTION, SOLUTION INTRAVENOUS CONTINUOUS
Status: DISCONTINUED | OUTPATIENT
Start: 2024-08-01 | End: 2024-08-01 | Stop reason: HOSPADM

## 2024-08-01 RX ORDER — PROMETHAZINE HYDROCHLORIDE 25 MG/ML
6.25 INJECTION, SOLUTION INTRAMUSCULAR; INTRAVENOUS ONCE AS NEEDED
Status: DISCONTINUED | OUTPATIENT
Start: 2024-08-01 | End: 2024-08-01 | Stop reason: HOSPADM

## 2024-08-01 RX ORDER — ONDANSETRON HYDROCHLORIDE 2 MG/ML
4 INJECTION, SOLUTION INTRAVENOUS ONCE AS NEEDED
Status: DISCONTINUED | OUTPATIENT
Start: 2024-08-01 | End: 2024-08-01 | Stop reason: HOSPADM

## 2024-08-01 RX ORDER — FENTANYL CITRATE 50 UG/ML
INJECTION, SOLUTION INTRAMUSCULAR; INTRAVENOUS
Status: COMPLETED
Start: 2024-08-01 | End: 2024-08-01

## 2024-08-01 RX ORDER — KETOROLAC TROMETHAMINE 30 MG/ML
INJECTION, SOLUTION INTRAMUSCULAR; INTRAVENOUS AS NEEDED
Status: DISCONTINUED | OUTPATIENT
Start: 2024-08-01 | End: 2024-08-01

## 2024-08-01 RX ORDER — SODIUM CHLORIDE, SODIUM LACTATE, POTASSIUM CHLORIDE, CALCIUM CHLORIDE 600; 310; 30; 20 MG/100ML; MG/100ML; MG/100ML; MG/100ML
100 INJECTION, SOLUTION INTRAVENOUS CONTINUOUS
Status: DISCONTINUED | OUTPATIENT
Start: 2024-08-01 | End: 2024-08-01 | Stop reason: CLARIF

## 2024-08-01 RX ORDER — BUPIVACAINE HCL/EPINEPHRINE 0.5-1:200K
VIAL (ML) INJECTION AS NEEDED
Status: DISCONTINUED | OUTPATIENT
Start: 2024-08-01 | End: 2024-08-01

## 2024-08-01 RX ORDER — OXYCODONE HYDROCHLORIDE 5 MG/1
5 TABLET ORAL EVERY 4 HOURS PRN
Status: DISCONTINUED | OUTPATIENT
Start: 2024-08-01 | End: 2024-08-01 | Stop reason: HOSPADM

## 2024-08-01 RX ORDER — FENTANYL CITRATE 50 UG/ML
INJECTION, SOLUTION INTRAMUSCULAR; INTRAVENOUS AS NEEDED
Status: DISCONTINUED | OUTPATIENT
Start: 2024-08-01 | End: 2024-08-01

## 2024-08-01 RX ORDER — CEFAZOLIN 1 G/1
INJECTION, POWDER, FOR SOLUTION INTRAVENOUS AS NEEDED
Status: DISCONTINUED | OUTPATIENT
Start: 2024-08-01 | End: 2024-08-01

## 2024-08-01 RX ORDER — DOCUSATE SODIUM 100 MG/1
100 CAPSULE, LIQUID FILLED ORAL 2 TIMES DAILY
Qty: 28 CAPSULE | Refills: 0 | Status: SHIPPED | OUTPATIENT
Start: 2024-08-01 | End: 2024-08-15

## 2024-08-01 RX ORDER — ONDANSETRON HYDROCHLORIDE 2 MG/ML
INJECTION, SOLUTION INTRAVENOUS AS NEEDED
Status: DISCONTINUED | OUTPATIENT
Start: 2024-08-01 | End: 2024-08-01

## 2024-08-01 RX ORDER — DIPHENHYDRAMINE HYDROCHLORIDE 50 MG/ML
12.5 INJECTION INTRAMUSCULAR; INTRAVENOUS ONCE AS NEEDED
Status: DISCONTINUED | OUTPATIENT
Start: 2024-08-01 | End: 2024-08-01 | Stop reason: HOSPADM

## 2024-08-01 RX ORDER — NAPROXEN SODIUM 220 MG/1
81 TABLET, FILM COATED ORAL 2 TIMES DAILY
Qty: 28 TABLET | Refills: 0 | Status: SHIPPED | OUTPATIENT
Start: 2024-08-01 | End: 2024-08-15

## 2024-08-01 RX ORDER — LIDOCAINE HCL/PF 100 MG/5ML
SYRINGE (ML) INTRAVENOUS AS NEEDED
Status: DISCONTINUED | OUTPATIENT
Start: 2024-08-01 | End: 2024-08-01

## 2024-08-01 RX ORDER — MELOXICAM 7.5 MG/1
7.5 TABLET ORAL DAILY
Qty: 14 TABLET | Refills: 0 | Status: SHIPPED | OUTPATIENT
Start: 2024-08-01 | End: 2024-08-15

## 2024-08-01 RX ORDER — LIDOCAINE HYDROCHLORIDE 10 MG/ML
0.1 INJECTION, SOLUTION EPIDURAL; INFILTRATION; INTRACAUDAL; PERINEURAL ONCE
Status: DISCONTINUED | OUTPATIENT
Start: 2024-08-01 | End: 2024-08-01 | Stop reason: HOSPADM

## 2024-08-01 RX ORDER — MIDAZOLAM HYDROCHLORIDE 1 MG/ML
INJECTION INTRAMUSCULAR; INTRAVENOUS
Status: DISCONTINUED
Start: 2024-08-01 | End: 2024-08-01 | Stop reason: HOSPADM

## 2024-08-01 RX ORDER — MIDAZOLAM HYDROCHLORIDE 1 MG/ML
INJECTION, SOLUTION INTRAMUSCULAR; INTRAVENOUS AS NEEDED
Status: DISCONTINUED | OUTPATIENT
Start: 2024-08-01 | End: 2024-08-01

## 2024-08-01 RX ORDER — LABETALOL HYDROCHLORIDE 5 MG/ML
5 INJECTION, SOLUTION INTRAVENOUS ONCE AS NEEDED
Status: DISCONTINUED | OUTPATIENT
Start: 2024-08-01 | End: 2024-08-01 | Stop reason: HOSPADM

## 2024-08-01 RX ORDER — BUPIVACAINE HCL/EPINEPHRINE 0.5-1:200K
VIAL (ML) INJECTION
Status: COMPLETED
Start: 2024-08-01 | End: 2024-08-01

## 2024-08-01 RX ORDER — CEFAZOLIN SODIUM 2 G/100ML
2 INJECTION, SOLUTION INTRAVENOUS ONCE
Status: DISCONTINUED | OUTPATIENT
Start: 2024-08-01 | End: 2024-08-01 | Stop reason: HOSPADM

## 2024-08-01 RX ORDER — OXYCODONE HYDROCHLORIDE 5 MG/1
5 TABLET ORAL EVERY 6 HOURS PRN
Qty: 28 TABLET | Refills: 0 | Status: SHIPPED | OUTPATIENT
Start: 2024-08-01 | End: 2024-08-08

## 2024-08-01 RX ORDER — PROPOFOL 10 MG/ML
INJECTION, EMULSION INTRAVENOUS AS NEEDED
Status: DISCONTINUED | OUTPATIENT
Start: 2024-08-01 | End: 2024-08-01

## 2024-08-01 RX ADMIN — HYDROMORPHONE HYDROCHLORIDE 0.5 MG: 1 INJECTION, SOLUTION INTRAMUSCULAR; INTRAVENOUS; SUBCUTANEOUS at 12:10

## 2024-08-01 RX ADMIN — OXYCODONE HYDROCHLORIDE 5 MG: 5 TABLET ORAL at 12:21

## 2024-08-01 RX ADMIN — SODIUM CHLORIDE, POTASSIUM CHLORIDE, SODIUM LACTATE AND CALCIUM CHLORIDE 100 ML/HR: 600; 310; 30; 20 INJECTION, SOLUTION INTRAVENOUS at 09:30

## 2024-08-01 RX ADMIN — HYDROMORPHONE HYDROCHLORIDE 0.5 MG: 1 INJECTION, SOLUTION INTRAMUSCULAR; INTRAVENOUS; SUBCUTANEOUS at 11:58

## 2024-08-01 RX ADMIN — HYDROMORPHONE HYDROCHLORIDE 0.5 MG: 1 INJECTION, SOLUTION INTRAMUSCULAR; INTRAVENOUS; SUBCUTANEOUS at 12:04

## 2024-08-01 ASSESSMENT — PAIN SCALES - GENERAL
PAINLEVEL_OUTOF10: 3
PAINLEVEL_OUTOF10: 3
PAINLEVEL_OUTOF10: 5 - MODERATE PAIN
PAINLEVEL_OUTOF10: 3
PAINLEVEL_OUTOF10: 3
PAINLEVEL_OUTOF10: 6
PAINLEVEL_OUTOF10: 7

## 2024-08-01 ASSESSMENT — PAIN - FUNCTIONAL ASSESSMENT
PAIN_FUNCTIONAL_ASSESSMENT: 0-10
PAIN_FUNCTIONAL_ASSESSMENT: UNABLE TO SELF-REPORT
PAIN_FUNCTIONAL_ASSESSMENT: 0-10

## 2024-08-01 ASSESSMENT — COLUMBIA-SUICIDE SEVERITY RATING SCALE - C-SSRS
1. IN THE PAST MONTH, HAVE YOU WISHED YOU WERE DEAD OR WISHED YOU COULD GO TO SLEEP AND NOT WAKE UP?: NO
6. HAVE YOU EVER DONE ANYTHING, STARTED TO DO ANYTHING, OR PREPARED TO DO ANYTHING TO END YOUR LIFE?: NO
2. HAVE YOU ACTUALLY HAD ANY THOUGHTS OF KILLING YOURSELF?: NO

## 2024-08-01 NOTE — ANESTHESIA PREPROCEDURE EVALUATION
Patient: Noe Avilez    Procedure Information       Date/Time: 08/01/24 1030    Procedures:       Left Ankle Tendon Repair (Left: Ankle)      Left Lower Extremity Hardware Removal (Left: Ankle)    Location: [facility] OR 05 / Virtual [facility] OR    Surgeons: Matt Foley MD            Relevant Problems   No relevant active problems       Clinical information reviewed:   Tobacco  Allergies  Meds   Med Hx  Surg Hx   Fam Hx  Soc Hx        NPO Detail:  NPO/Void Status  Carbohydrate Drink Given Prior to Surgery? : N  Date of Last Liquid: 08/01/24  Time of Last Liquid: 0600  Date of Last Solid: 07/31/24  Time of Last Solid: 2000  Last Intake Type: Clear fluids  Time of Last Void: 0921         Physical Exam    Airway  Mallampati: II  TM distance: >3 FB  Neck ROM: full     Cardiovascular   Rhythm: regular  Rate: normal     Dental    Pulmonary   Breath sounds clear to auscultation     Abdominal            Anesthesia Plan    History of general anesthesia?: yes  History of complications of general anesthesia?: no    ASA 2     general     intravenous induction   Anesthetic plan and risks discussed with patient.    Plan discussed with CRNA.

## 2024-08-01 NOTE — ANESTHESIA PROCEDURE NOTES
Peripheral Block    Patient location during procedure: pre-op  Start time: 8/1/2024 10:00 AM  End time: 8/1/2024 10:01 AM  Reason for block: at surgeon's request and post-op pain management  Staffing  Performed: attending   Authorized by: Delon Salas MD    Performed by: Delon Salas MD  Preanesthetic Checklist  Completed: patient identified, IV checked, site marked, risks and benefits discussed, surgical consent, monitors and equipment checked, pre-op evaluation and timeout performed   Timeout performed at:   Peripheral Block  Patient position: laying flat  Prep: ChloraPrep and site prepped and draped  Patient monitoring: continuous pulse ox, heart rate and cardiac monitor  Block type: popliteal  Laterality: left  Injection technique: single-shot  Guidance: ultrasound guided  Local infiltration: lidocaine  Infiltration strength: 1 %  Dose: 3 mL  Needle  Needle type: short-bevel   Needle gauge: 22 G  Needle length: 8 cm  Needle localization: ultrasound guidance  Test dose: negative  Assessment  Injection assessment: negative aspiration for heme, local visualized surrounding nerve on ultrasound, no paresthesia on injection and incremental injection  Heart rate change: no  Slow fractionated injection: yes

## 2024-08-01 NOTE — OP NOTE
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  ASA: II  Anesthesia Staff: Anesthesiologist: Delon Salas MD; Jesus Mi MD  C-AA: NI Solis; NI Miles  JODI: Noe Cardoza  Estimated Blood Loss: 1mL  Intra-op Medications:   Administrations occurring from 1030 to 1200 on 24:   Medication Name Total Dose   BUPivacaine-EPINEPHrine (Marcaine w/EPI) injection  - Omnicell Override Pull Cannot be calculated   fentaNYL PF (Sublimaze) injection  - Omnicell Override Pull Cannot be calculated              Anesthesia Record               Intraprocedure I/O Totals       None           Specimen: No specimens collected     Staff:   Circulator: Linda Beasleyub Person: Tiffany Paz Circulator: Mercy Paz Scrub: Cadence         Drains and/or Catheters: * None in log *    Tourniquet Times:     Total Tourniquet Time Documented:  Calf (Left) - 67 minutes  Total: Calf (Left) - 67 minutes      Implants:  Implants       Type Name Action Serial No.      Implant SUTURE ANCHOR, DX  FIBERTAK P1 FW & NDLS - [id number] Implanted      Implant SUTURE ANCHOR, DX FIBERTAK P1 FW & NDLS - [id number] Implanted               Findings: Tenofibrosis peroneal longus and brevis with underlying tendinosis/split tear peroneal brevis with previous braided suture.  Prominent inferior calcaneal screw    Indications: Noe Avilez is an 44 y.o. 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  Previous subtalar fusion with painful prominent screw inferior heel.  Pain over peroneal tendons with MRI suggesting peroneal brevis split tear.  Here for surgical repair.    The patient was seen in the preoperative area. The risks, benefits, complications, treatment options, non-operative alternatives, expected recovery and outcomes were discussed with the patient. The possibilities of reaction to medication, pulmonary aspiration, injury to surrounding structures, bleeding, recurrent infection, the need for additional procedures, failure to diagnose a condition, and creating a complication requiring transfusion or operation were discussed with the patient. The patient concurred with the proposed plan, giving informed consent.  The site of surgery was properly noted/marked if necessary per policy. The patient has been actively warmed in preoperative area. Preoperative antibiotics have been ordered and given within 1 hours of incision. Venous thrombosis prophylaxis are not indicated.    Procedure Details:   Preop DX: Left and ankle peroneal brevis tear status post previous peroneal tendon surgery; painful hardware status post subtalar fusion  Postop DX: Same  Procedure: 1.  Left ankle peroneal brevis/longus tendon debridement and tenodesis 2.  Left ankle peroneal retinacular reconstruction.  3.  Left heel screw removal  Surgeon: Matt Foley MD  Asst: Neville Lee MD  Anesthesia: General and regional  Clinical Note: 44-year-old male with left ankle  peroneal brevis tendon split by MRI tear.  [place]oneal [place] state.  Also has painful hardware under his heel from previous subtalar fusion.  Failed conservative treatment.   Here for surgical treatment.  Understood risk of surgery including bleeding, infection, stiffness, weakness, possible need for further surgery or bracing.  Understood these risks wished to proceed.  Procedure Note: Patient brought to operating room after regional anesthesia.  Timeout performed.  Antibiotics given IV.  Left leg was prepped draped typical sterile fashion with a tourniquet on the calf.  Leg was exsanguinated and tourniquet inflated 275 mmHg.  Longitudinal incision was made over the previous scar.  Extended proximal and distal.  Incision was more posterior than normal so anterior flap was elevated to reach the posterior fibular edge.  Retinaculum was incised along the posterior lateral edge of the fibula down the lateral hindfoot.  Peroneal tendons were identified.  There is evidence of thick braided suture in both the longus and brevis tendon.  Significant scarring of both tendons within the sheath.  The tendons were debrided of the suture and surrounding scar.  The longus and brevis tendons were then tenodesed with a running locked 3-0 nylon suture.  Good tubularization was obtained.  The posterior fibular groove was smoothed with a good bony ridge.   Tendons moved smoothly behind the fibula.  Small bony trough was made on the posterior fibula.  2 fiber lock anchors were placed, 1 at the tip and 1 more proximal.  With the tendons reduced, the retinaculum was repaired down using the suture anchors with good fixation along the posterior edge of the fibula.  The tendons moved smoothly with no subluxation.  No impingement.  A swivel lock anchor was placed in the lateral fibula for knotless repair.  Incision was then made in the inferior heel over the previous scar.  C-arm imaging was used to assist with  hardware removal.  Screw head was identified.  Removed with a large fragment screwdriver.  Wound was irrigated.  Closed with nylon suture.  Ankle wound was irrigated.  Closed in layers.  Placed in a posterior splint with sugar-tong's.  Patient tolerated the procedure well and was taken recovery in stable condition.  Complications:  None; patient tolerated the procedure well.    Disposition: PACU - hemodynamically stable.  Condition: stable         Additional Details: None    Attending Attestation: I was present and scrubbed for the entire procedure.    Matt Foley  Phone Number: [phone]

## 2024-08-01 NOTE — ANESTHESIA PROCEDURE NOTES
Airway  Date/Time: 8/1/2024 10:24 AM  Urgency: elective    Airway not difficult    Staffing  Performed: JODI   Authorized by: Delon Salas MD    Performed by: NI Solis  Patient location during procedure: OR    Indications and Patient Condition  Indications for airway management: anesthesia  Spontaneous Ventilation: absent  Sedation level: deep  Preoxygenated: yes  Patient position: sniffing  Mask difficulty assessment: 1 - vent by mask    Final Airway Details  Final airway type: supraglottic airway      Successful airway: Supraglottic airway: iGel.  Size 4     Number of attempts at approach: 1

## 2024-08-01 NOTE — NURSING NOTE
1250 Pt assisted with dressing with help of family. IV removed dressing applied. Discharge instructions provided to pt and family. Educated on medications, effects of anesthesia, and homecoming care. Pt and family verbalizing understanding of all instructions provided at this time. All questions and concerns answered. Pt given contact information for provider.

## 2024-08-05 DIAGNOSIS — M25.571 SINUS TARSI SYNDROME OF BOTH ANKLES: ICD-10-CM

## 2024-08-05 DIAGNOSIS — M76.71 PERONEAL TENDINITIS OF BOTH LOWER LEGS: ICD-10-CM

## 2024-08-05 DIAGNOSIS — Q66.89 TARSAL COALITION OF BOTH FEET: ICD-10-CM

## 2024-08-05 DIAGNOSIS — M25.572 SINUS TARSI SYNDROME OF BOTH ANKLES: ICD-10-CM

## 2024-08-05 DIAGNOSIS — M76.72 PERONEAL TENDINITIS OF BOTH LOWER LEGS: ICD-10-CM

## 2024-08-05 ASSESSMENT — PAIN SCALES - GENERAL: PAINLEVEL_OUTOF10: 2

## 2024-08-14 ENCOUNTER — APPOINTMENT (OUTPATIENT)
Dept: ORTHOPEDIC SURGERY | Facility: CLINIC | Age: 44
End: 2024-08-14
Payer: COMMERCIAL

## 2024-08-14 DIAGNOSIS — S86.319S TEAR OF PERONEAL TENDON, SEQUELA: Primary | ICD-10-CM

## 2024-08-14 PROCEDURE — L4361 PNEUMA/VAC WALK BOOT PRE OTS: HCPCS | Performed by: ORTHOPAEDIC SURGERY

## 2024-08-14 PROCEDURE — 1036F TOBACCO NON-USER: CPT | Performed by: ORTHOPAEDIC SURGERY

## 2024-08-14 PROCEDURE — 99024 POSTOP FOLLOW-UP VISIT: CPT | Performed by: ORTHOPAEDIC SURGERY

## 2024-08-14 ASSESSMENT — PAIN - FUNCTIONAL ASSESSMENT: PAIN_FUNCTIONAL_ASSESSMENT: 0-10

## 2024-08-14 ASSESSMENT — PAIN DESCRIPTION - DESCRIPTORS: DESCRIPTORS: SPASM;TINGLING

## 2024-08-14 ASSESSMENT — PAIN SCALES - GENERAL: PAINLEVEL_OUTOF10: 5 - MODERATE PAIN

## 2024-08-14 NOTE — PROGRESS NOTES
S: Pain well controlled.  Having some tingling along the lateral foot.    O: Incisions healing nicely. Good alignment.  No pain with active ankle motion.    A: S/P left peroneal tendon tenodesis and hardware removal    P: Ace wrap.  Cast boot.  Ankle flexion extension couple times a day.  No inversion eversion.  Follow-up examination 2 weeks.  Will plan therapy after next visit.  May consider right subtalar fusion later this year.

## 2024-08-28 ENCOUNTER — APPOINTMENT (OUTPATIENT)
Dept: ORTHOPEDIC SURGERY | Facility: CLINIC | Age: 44
End: 2024-08-28
Payer: COMMERCIAL

## 2024-08-28 DIAGNOSIS — S86.319S TEAR OF PERONEAL TENDON, SEQUELA: ICD-10-CM

## 2024-08-28 DIAGNOSIS — Q66.89 TARSAL COALITION OF BOTH FEET: Primary | ICD-10-CM

## 2024-08-28 PROCEDURE — 99024 POSTOP FOLLOW-UP VISIT: CPT | Performed by: ORTHOPAEDIC SURGERY

## 2024-08-28 PROCEDURE — 1036F TOBACCO NON-USER: CPT | Performed by: ORTHOPAEDIC SURGERY

## 2024-08-28 RX ORDER — CEFAZOLIN SODIUM 2 G/100ML
2 INJECTION, SOLUTION INTRAVENOUS ONCE
OUTPATIENT
Start: 2024-08-28 | End: 2024-08-28

## 2024-08-28 RX ORDER — SODIUM CHLORIDE, SODIUM LACTATE, POTASSIUM CHLORIDE, CALCIUM CHLORIDE 600; 310; 30; 20 MG/100ML; MG/100ML; MG/100ML; MG/100ML
20 INJECTION, SOLUTION INTRAVENOUS CONTINUOUS
OUTPATIENT
Start: 2024-08-28

## 2024-08-28 ASSESSMENT — PAIN - FUNCTIONAL ASSESSMENT: PAIN_FUNCTIONAL_ASSESSMENT: NO/DENIES PAIN

## 2024-08-28 NOTE — PROGRESS NOTES
Returns for left foot.  Feeling good in the boot.  Ready to start therapy.  Wants to go ahead with right foot surgery later this year.    Exam: Lateral incision well-healed.  No pain with ankle motion.  Stiff subtalar joint.    Assessment: Status post left peroneal tendon reconstruction.  Right subtalar coalition.    Plan: Discussed nonoperative and operative options in detail.   Risk and benefits discussed in detail. All questions answered today.  Recovery timeline and expectations discussed in detail.  Progress weightbearing in boot.  Wean to shoes.  Begin physical therapy.  Follow-up exam in 1 month.  Plan right subtalar fusion in the fall.  Right subtalar fusion 54186. Arthrex 7.0 headless screws. C-arm. Anesthesia: Regional and general. 45 minutes   Crutches or Walker/ Consider knee scooter

## 2024-10-02 ENCOUNTER — APPOINTMENT (OUTPATIENT)
Dept: ORTHOPEDIC SURGERY | Facility: CLINIC | Age: 44
End: 2024-10-02
Payer: COMMERCIAL

## 2024-10-02 DIAGNOSIS — S86.319S TEAR OF PERONEAL TENDON, SEQUELA: ICD-10-CM

## 2024-10-02 DIAGNOSIS — Q66.89 TARSAL COALITION OF BOTH FEET: Primary | ICD-10-CM

## 2024-10-02 PROCEDURE — 1036F TOBACCO NON-USER: CPT | Performed by: ORTHOPAEDIC SURGERY

## 2024-10-02 PROCEDURE — 99024 POSTOP FOLLOW-UP VISIT: CPT | Performed by: ORTHOPAEDIC SURGERY

## 2024-10-02 ASSESSMENT — PAIN DESCRIPTION - DESCRIPTORS: DESCRIPTORS: TIGHTNESS;SORE

## 2024-10-02 ASSESSMENT — PAIN SCALES - GENERAL: PAINLEVEL_OUTOF10: 1

## 2024-10-02 ASSESSMENT — PAIN - FUNCTIONAL ASSESSMENT: PAIN_FUNCTIONAL_ASSESSMENT: 0-10

## 2024-10-02 NOTE — PROGRESS NOTES
Returns for both feet.  Left ankle doing very well.  Getting stronger with therapy.  Ready for surgery on his right foot in December.    Exam: Well-healed scar lateral left ankle.  No pain or crepitus with active ankle motion.  Minimal inversion eversion.    Assessment: Status post left peroneal tendon reconstruction/hardware removal.  Right hindfoot middle facet coalition.    Plan: Discussed nonoperative and operative options in detail.   Risk and benefits discussed in detail. All questions answered today.  Recovery timeline and expectations discussed in detail.  Okay for low impact exercise as tolerated for the left ankle.  Will plan subtalar fusion of the right heel in December.  Postop recovery discussed in detail.

## 2024-11-13 ENCOUNTER — CLINICAL SUPPORT (OUTPATIENT)
Dept: PREADMISSION TESTING | Facility: HOSPITAL | Age: 44
End: 2024-11-13
Payer: COMMERCIAL

## 2024-11-13 DIAGNOSIS — Q66.89 TARSAL COALITION OF BOTH FEET: ICD-10-CM

## 2024-11-13 RX ORDER — IBUPROFEN 400 MG/1
400 TABLET ORAL EVERY 6 HOURS PRN
COMMUNITY

## 2024-11-22 ENCOUNTER — LAB (OUTPATIENT)
Dept: LAB | Facility: LAB | Age: 44
End: 2024-11-22
Payer: COMMERCIAL

## 2024-11-22 ENCOUNTER — PRE-ADMISSION TESTING (OUTPATIENT)
Dept: PREADMISSION TESTING | Facility: HOSPITAL | Age: 44
End: 2024-11-22
Payer: COMMERCIAL

## 2024-11-22 VITALS
TEMPERATURE: 96.7 F | OXYGEN SATURATION: 99 % | HEART RATE: 75 BPM | HEIGHT: 69 IN | RESPIRATION RATE: 16 BRPM | WEIGHT: 171.96 LBS | SYSTOLIC BLOOD PRESSURE: 121 MMHG | BODY MASS INDEX: 25.47 KG/M2 | DIASTOLIC BLOOD PRESSURE: 74 MMHG

## 2024-11-22 DIAGNOSIS — Z01.818 PREOP TESTING: Primary | ICD-10-CM

## 2024-11-22 DIAGNOSIS — Q66.89 TARSAL COALITION OF BOTH FEET: ICD-10-CM

## 2024-11-22 LAB
ANION GAP SERPL CALC-SCNC: 11 MMOL/L (ref 10–20)
BUN SERPL-MCNC: 13 MG/DL (ref 6–23)
CALCIUM SERPL-MCNC: 9 MG/DL (ref 8.6–10.3)
CHLORIDE SERPL-SCNC: 108 MMOL/L (ref 98–107)
CO2 SERPL-SCNC: 25 MMOL/L (ref 21–32)
CREAT SERPL-MCNC: 1.01 MG/DL (ref 0.5–1.3)
EGFRCR SERPLBLD CKD-EPI 2021: >90 ML/MIN/1.73M*2
ERYTHROCYTE [DISTWIDTH] IN BLOOD BY AUTOMATED COUNT: 12.8 % (ref 11.5–14.5)
GLUCOSE SERPL-MCNC: 99 MG/DL (ref 74–99)
HCT VFR BLD AUTO: 44.9 % (ref 41–52)
HGB BLD-MCNC: 14.8 G/DL (ref 13.5–17.5)
MCH RBC QN AUTO: 28.5 PG (ref 26–34)
MCHC RBC AUTO-ENTMCNC: 33 G/DL (ref 32–36)
MCV RBC AUTO: 86 FL (ref 80–100)
NRBC BLD-RTO: 0 /100 WBCS (ref 0–0)
PLATELET # BLD AUTO: 277 X10*3/UL (ref 150–450)
POTASSIUM SERPL-SCNC: 4.3 MMOL/L (ref 3.5–5.3)
RBC # BLD AUTO: 5.2 X10*6/UL (ref 4.5–5.9)
SODIUM SERPL-SCNC: 140 MMOL/L (ref 136–145)
WBC # BLD AUTO: 5.5 X10*3/UL (ref 4.4–11.3)

## 2024-11-22 PROCEDURE — 80048 BASIC METABOLIC PNL TOTAL CA: CPT

## 2024-11-22 PROCEDURE — 99213 OFFICE O/P EST LOW 20 MIN: CPT | Performed by: PHYSICIAN ASSISTANT

## 2024-11-22 PROCEDURE — 87081 CULTURE SCREEN ONLY: CPT | Mod: AHULAB | Performed by: PHYSICIAN ASSISTANT

## 2024-11-22 PROCEDURE — 36415 COLL VENOUS BLD VENIPUNCTURE: CPT

## 2024-11-22 PROCEDURE — 85027 COMPLETE CBC AUTOMATED: CPT

## 2024-11-22 RX ORDER — CHLORHEXIDINE GLUCONATE ORAL RINSE 1.2 MG/ML
SOLUTION DENTAL
Qty: 473 ML | Refills: 0 | Status: SHIPPED | OUTPATIENT
Start: 2024-11-22

## 2024-11-22 ASSESSMENT — ENCOUNTER SYMPTOMS
ALLERGIC/IMMUNOLOGIC NEGATIVE: 1
HEMATOLOGIC/LYMPHATIC NEGATIVE: 1
NEUROLOGICAL NEGATIVE: 1
CONSTITUTIONAL NEGATIVE: 1
RESPIRATORY NEGATIVE: 1
GASTROINTESTINAL NEGATIVE: 1
CARDIOVASCULAR NEGATIVE: 1
ENDOCRINE NEGATIVE: 1
MUSCULOSKELETAL NEGATIVE: 1
EYES NEGATIVE: 1
PSYCHIATRIC NEGATIVE: 1

## 2024-11-22 NOTE — PREPROCEDURE INSTRUCTIONS
Medication List            Accurate as of November 22, 2024  7:29 AM. Always use your most recent med list.                ibuprofen 400 mg tablet  Notes to patient: HOLD 7 Days prior to surgery - LD 11/24     loratadine 5 mg chewable tablet  Commonly known as: Claritin  Notes to patient: Use if needed morning of surgery                 Concerning above medication instructions - If medication is normally taken at night continue normal schedule - do not take night prior and morning of surgery.     CONTACT SURGEON'S OFFICE IF YOU DEVELOP:  * Fever = 100.4 F   * New respiratory symptoms (e.g. cough, shortness of breath, respiratory distress, sore throat)  * Recent loss of taste or smell  *Flu like symptoms such as headache, fatigue or gastrointestinal symptoms  * You develop any open sores, shingles, burning or painful urination   AND/OR:  * You no longer wish to have the surgery.  * Any other personal circumstances change that may lead to the need to cancel or defer this surgery.  *You were admitted to any hospital within one week of your planned procedure.    SMOKING:  *Quitting smoking can make a huge difference to your health and recovery from surgery.    *If you need help with quitting, call [phone]-NOW.    THE DAY OF SURGERY:  *Do not eat any food after midnight the night before surgery/procedure.   *YOU MUST DRINK 14 oz drink clear liquids (i.e. water, black coffee/tea, (no milk or cream) apple juice, and electrolyte drinks (Gatorade)  2 hours before your instructed arrival time to the hospital.  *You may chew gum until  2 hours before your surgery/procedure.    SURGICAL TIME  *You will be contacted between 2 p.m. and 6 p.m. the business day before your surgery with your arrival time.  *If you haven't received a call by 6pm, call [phone].  *Scheduled surgery times may change and you will be notified if this occurs-check your personal voicemail for any updates.    ON THE MORNING OF SURGERY:  *Wear  comfortable, loose fitting clothing.   *Do not use moisturizers, creams, lotions or perfume.  *All jewelry and valuables should be left at home.  *Prosthetic devices such as contact lenses, hearing aids, dentures, eyelash extensions, hairpins and body piercing must be removed before surgery.    BRING WITH YOU:  *Photo ID and insurance card  *Current list of medicines and allergies  *Pacemaker/Defibrillator/Heart stent cards  *CPAP machine and mask  *Slings/splints/crutches  *Copy of your complete Advanced Directive/DHPOA-if applicable  *Neurostimulator implant remote    PARKING AND ARRIVAL:  *Check in at the Main Entrance desk and let them know you are here for surgery.  *You will be directed to the 2nd floor surgical waiting area.    AFTER OUTPATIENT SURGERY:  *A responsible adult MUST accompany you at the time of discharge and stay with you for 24 hours after your surgery.  *You may NOT drive yourself home after surgery.  *You may use a taxi or ride sharing service ([employer], [employer]) to return home ONLY if you are accompanied by a friend or family member.  *Instructions for resuming your medications will be provided by your surgeon.      Home Preoperative Antibacterial Shower     What is a home preoperative antibacterial shower?  This shower is a way of cleaning the skin with a germ killing soap before surgery.  The soap contains chlorhexidine, commonly known as CHG.  CHG is a soap for your skin with germ killing ability.  Let your doctor know if you are allergic to chlorhexidine.    Why do I need to take a preoperative antibacterial shower?  Skin is not sterile.  It is best to try to make your skin as free of germs as possible before surgery.  Proper cleansing with a germ killing soap before surgery can lower the number of germs on your skin.  This helps to reduce the risk of infection at the surgical site.  Following the instructions listed below will help you prepare your skin for surgery.      How do I use the CHG  skin cleanser?  Steps:  Begin using your CHG soap five days before your scheduled surgery on ________________________.    Days 1-4 Shower before bed:  Wash your face and genitals with your normal soap and rinse.    Wash and rinse your hair using the CHG soap. Rinse completely, do not condition your   Hair.          3.    Apply the CHG soap to a clean wet washcloth.  Turn the water off or move away                From the water spray to avoid premature rinsing of the CHG soap as you are applying.     4.   Lather your entire body from the neck down.  Do not use on your face or genitals.   Pay special attention to the area(s) where your incision(s) will be located unless they are on your face.  Avoid scrubbing your skin too hard.  The important point is to have the CHG soap sit on your skin for 3 minutes.    When the 3 minutes are up, turn on the water and rinse the CHG soap off your body completely.   Pat yourself dry with a clean, freshly-laundered towel.  Dress in clean, freshly laundered night clothes.    Be sure to sleep with clean, freshly laundered sheets.  Day 5:  Last shower is the morning before surgery: Follow above Instructions.    NOTE:    *Hair extensions should be removed    *Keep CHG soap out of eyes and ear canals   *DO NOT wash with regular soap on your body after you have used the CHG        soap solution  *DO NOT apply powders, lotions, or perfume.  *Deodorant may be used days 1-4, BUT NOT the day of surgery    Who should I contact if I have any questions regarding the use of CHG soap?  Call the [facility], Preadmission Testing at [phone] if you have any questions.              Patient Information: Pre-Operative Infection Prevention Measures     Why did I have my nose, under my arms and groin swabbed?  The purpose of the swab is to identify Staphylococcus aureus inside your nose or on your skin.  The swab was sent to the laboratory for culture.  A positive  swab/culture for Staphylococcus aureus is called colonization or carriage.      What is Staphylococcus aureus?  Staphylococcus aureus, also known as “staph”, is a germ found on the skin or in the nose of healthy people.  Sometimes Staphylococcus aureus can get into the body and cause an infection.  This can be minor (such as pimples, boils or other skin problems).  It might also be serious (such as blood infection, pneumonia or a surgical site infection).    What is Staphylococcus aureus colonization or carriage?  Colonization or carriage means that a person has the germ but is not sick from it.  These bacteria can be spread on the hands or when breathing or sneezing.    How is Staphylococcus aureus spread?  It is most often spread by close contact with a person or item that carries it.    What happens if my culture is positive for Staphylococcus aureus?  Your doctor/medical team will use this information to guide any antibiotic treatment which may be necessary.  Regardless of the culture results, we will clean the inside of your nose with a betadine swab just before you have your surgery.      Will I get an infection if I have Staphylococcus aureus in my nose or on my skin?  Anyone can get an infection with Staphylococcus aureus.  However, the best way to reduce your risk of infection is to follow the instructions provided to you for the use of your CHG soap and dental rinse.        Who should I contact if I have any questions?  Call the [facility], Preadmission Testing at [phone] if you have any questions.           Patient Information: Oral/Dental Rinse  **This is a prescription; pick it up at your preferred local pharmacy **  What is oral/dental rinse?   It is a mouthwash. It is a way of cleaning the mouth with a germ killing solution before your surgery.  The solution contains chlorhexidine, commonly known as CHG.   It is used inside the mouth to kill a bacteria known as  Staphylococcus aureus.  Let your doctor know if you are allergic to Chlorhexidine.    Why do I need to use CHG oral/dental rinse?  The CHG oral/dental rinse helps to kill a bacteria in your mouth known a Staphylococcus aureus.     This reduces the risk of infection at the surgical site.      Using your CHG oral/dental rinse  STEPS:  Use your CHG oral/dental rinse after you brush your teeth the night before (at bedtime) and the morning of your surgery.  Follow all directions on your prescription label.    Use the cap on the container to measure 15ml (fill cap to fill line)  Swish (gargle if you can) the mouthwash in your mouth for at least 30 seconds, (do not to swallow) spit out  After you use your CHG rinse, do not rinse your mouth with water, drink or eat.  Please refer to prescription label for the appropriate time to resume oral intake  Dental rinse comes in one size bottle: 473ml ~16oz.  You will have leftover    rinse, discard after this use.    What side effects might I have using the CHG oral/dental rinse?  CHG rinse will stick to plaque on the teeth.  Brush and floss just before use.  Teeth brushing will help avoid staining of plaque during use.    Who should I contact if I have questions about the CHG oral/dental rinse?  Please call [facility], Preadmission Testing at [phone] if you have any questions

## 2024-11-22 NOTE — CPM/PAT NURSE NOTE
CPM/PAT Nurse Note      Name: Noe Avilez (Noe Avilez)  /Age: 1980/44 y.o.       Past Medical History:   Diagnosis Date    Anxiety     Flat foot     Fracture of ankle     HLD (hyperlipidemia)     Joint pain     bilateral ankles    Peroneal tendonitis     bilateral LE's    Tarsal coalition of both feet     Vitamin D deficiency     not on supplement       Past Surgical History:   Procedure Laterality Date    ANKLE FRACTURE SURGERY Left 2009    fusion of left ankle joint    ANKLE SURGERY Left 2024    tendon repair    LEG TENDON SURGERY Left 2011    ruptured tendon repair    LIPOMA RESECTION  2024    chest    VASECTOMY         Patient  reports being sexually active and has had partner(s) who are female. He reports using the following method of birth control/protection: Surgical.    Family History   Problem Relation Name Age of Onset    Other (HLD) Mother      Sarcoidosis Father      No Known Problems Sister      Cancer Maternal Grandfather Edi Avilez     Cancer Mother's Sister Kassi García        Allergies   Allergen Reactions    Naproxen GI Upset    Sulfamethoxazole-Trimethoprim Headache     PT HAD A MIGRAINE    Vancomycin Hives and Itching       Prior to Admission medications    Medication Sig Start Date End Date Taking? Authorizing Provider   ibuprofen 400 mg tablet Take 1 tablet (400 mg) by mouth every 6 hours if needed for moderate pain (4 - 6).   Yes Historical Provider, MD   loratadine (Claritin) 5 mg chewable tablet Chew 1 tablet (5 mg) if needed for allergies.   Yes Historical Provider, MD ALLISON BLEVINS     DASI Risk Score    No data to display       Caprini DVT Assessment    No data to display       Modified Frailty Index    No data to display       CHADS2 Stroke Risk         N/A 3 to 100%: High Risk   2 to < 3%: Medium Risk   0 to < 2%: Low Risk     Last Change: N/A          This score determines the patient's risk of having a stroke if the patient has atrial fibrillation.        This  score is not applicable to this patient. Components are not calculated.          Revised Cardiac Risk Index    No data to display       Apfel Simplified Score    No data to display       Risk Analysis Index Results This Encounter    No data found in the last 10 encounters.       Prodigy: High Risk  Total Score: 8              Prodigy Gender Score          ARISCAT Score for Postoperative Pulmonary Complications    No data to display       Belle Perioperative Risk for Myocardial Infarction or Cardiac Arrest (IMMANUEL)    No data to display         Nurse Plan of Action:     After Visit Summary (AVS) reviewed and patient verbalized good understanding of medications and NPO instructions.  Pre-op infection prevention measures:  CHG showers and mouthwash reviewed, understanding voiced.  CHG soap given and patient verbalized need to pick CHG mouthwash at their preferred local pharmacy.

## 2024-11-22 NOTE — CPM/PAT H&P
[facility] Evaluation       Name: Noe Avilez (Noe Avilez)  /Age: 1980/44 y.o.     In-Person       Chief Complaint: Tarsal coalition of both feet     HPI    Date of Consult: 24    Referring Provider:Dr. Foley    Surgery, Date, and Length: Right Foot Subtalar Joint Arthrodesis; 24; 120 minutes     Noe Avilez  is a 44 year-old male who presents to the [facility] for perioperative risk assessment prior to surgery. Patient is   S/P left peroneal tendon tenodesis and hardware removal 24 and doing well now.  No problem post op with last surgery but prior ankle/foot surgery did have post op infection that needed I&D and wound vac. Now presents for right foot pain. States longstanding issue pain.  No trauma and no prior surgery on right.    This note was created in part upon personal review of patient's medical records.      Patient is scheduled to have Right Foot Subtalar Joint Arthrodesis     Medical History  Past Medical History:   Diagnosis Date    Anxiety     Flat foot     HLD (hyperlipidemia)     no medication presently    Joint pain     bilateral ankles    Peroneal tendonitis     bilateral LE's    Tarsal coalition of both feet     Vitamin D deficiency     not on supplement        STOP BANG =  1  (male)    Caprini =  4   (age,surgery,BMI>25)    Surgical History  Past Surgical History:   Procedure Laterality Date    ANKLE FRACTURE SURGERY Left     fusion of left ankle joint for coalition fracture    ANKLE SURGERY Left 2024    tendon repair    LEG TENDON SURGERY Left     ruptured tendon repair    LIPOMA RESECTION  2024    chest    VASECTOMY               Family history:  Family History   Problem Relation Name Age of Onset    Other (HLD) Mother      Sarcoidosis Father      No Known Problems Sister      Cancer Maternal Grandfather Edi Avilez     Cancer Mother's Sister Kassi García         Social history:  Social History     Socioeconomic History    Marital status:       "Spouse name: Not on file    Number of children: Not on file    Years of education: Not on file    Highest education level: Not on file   Occupational History    Not on file   Tobacco Use    Smoking status: Never    Smokeless tobacco: Never   Vaping Use    Vaping status: Never Used   Substance and Sexual Activity    Alcohol use: Not Currently     Comment: 1 drink maybe every few weeks    Drug use: Never    Sexual activity: Yes     Partners: Female     Birth control/protection: Surgical   Other Topics Concern    Not on file   Social History Narrative    Not on file     Social Drivers of Health     Financial Resource Strain: Not on file   Food Insecurity: Not on file   Transportation Needs: Not on file   Physical Activity: Not on file   Stress: Not on file   Social Connections: Not on file   Intimate Partner Violence: Not on file   Housing Stability: Not on file          Current Outpatient Medications:     ibuprofen 400 mg tablet, Take 1 tablet (400 mg) by mouth every 6 hours if needed for moderate pain (4 - 6)., Disp: , Rfl:     loratadine (Claritin) 5 mg chewable tablet, Chew 1 tablet (5 mg) if needed for allergies., Disp: , Rfl:        Visit Vitals  /74   Pulse 75   Temp 35.9 °C (96.7 °F)   Resp 16   Ht 1.75 m (5' 8.9\")   Wt 78 kg (171 lb 15.3 oz)   SpO2 99%   BMI 25.47 kg/m²   Smoking Status Never   BSA 1.95 m²      Review of Systems   Constitutional: Negative.    HENT: Negative.     Eyes: Negative.    Respiratory: Negative.     Cardiovascular: Negative.         METS 4   only limited by foot/ankle pain    Gastrointestinal: Negative.    Endocrine: Negative.    Genitourinary: Negative.    Musculoskeletal: Negative.         Right ankle/foot pain    Skin: Negative.    Allergic/Immunologic: Negative.    Neurological: Negative.    Hematological: Negative.    Psychiatric/Behavioral: Negative.          Physical Exam  Constitutional:       Appearance: Normal appearance.   HENT:      Head: Normocephalic and atraumatic. "      Right Ear: External ear normal.      Left Ear: External ear normal.      Nose: Nose normal.      Mouth/Throat:      Pharynx: Oropharynx is clear.   Eyes:      Conjunctiva/sclera: Conjunctivae normal.   Cardiovascular:      Rate and Rhythm: Normal rate and regular rhythm.      Heart sounds: Normal heart sounds.   Pulmonary:      Effort: Pulmonary effort is normal.      Breath sounds: Normal breath sounds.   Abdominal:      General: Abdomen is flat.      Palpations: Abdomen is soft.   Musculoskeletal:         General: Normal range of motion.      Cervical back: Normal range of motion and neck supple.   Skin:     General: Skin is warm and dry.   Neurological:      General: No focal deficit present.      Mental Status: He is alert and oriented to person, place, and time.   Psychiatric:         Mood and Affect: Mood normal.         Behavior: Behavior normal.         Thought Content: Thought content normal.         Judgment: Judgment normal.          PAT AIRWAY:   Airway:     Mallampati::  II    Neck ROM::  Full   Lower permanent bar     Lab Results   Component Value Date    WBC 5.5 11/22/2024    HGB 14.8 11/22/2024    HCT 44.9 11/22/2024    MCV 86 11/22/2024     11/22/2024      Lab Results   Component Value Date    GLUCOSE 99 11/22/2024    CALCIUM 9.0 11/22/2024     11/22/2024    K 4.3 11/22/2024    CO2 25 11/22/2024     (H) 11/22/2024    BUN 13 11/22/2024    CREATININE 1.01 11/22/2024      RCRI  0  , 3.9 % Risk of MACE    Hematology       Patient instructed to ambulate as soon as possible postoperatively to decrease thromboembolic risk.      Initiate mechanical DVT prophylaxis as soon as possible and initiate chemical prophylaxis when deemed safe from a bleeding standpoint post surgery.       VTE prophylaxis per surgical team       Tests ordered in PAT: cbc, bmp, mrsa   LABS REVIEWED: unremarkable     Risk assessment complete.  Patient is scheduled for a low/intermediate surgical risk procedure.         Preoperative medication instructions were provided and reviewed with the patient.  Any additional testing or evaluation was explained to the patient.  Nothing by mouth instructions were discussed and patient's questions were answered prior to conclusion to this encounter.  Patient verbalized understanding of preoperative instructions given in preadmission testing; discharge instructions available in EMR.    This note was dictated by a speech recognition.  Minor errors may have been detected in a speech recognition.

## 2024-11-24 LAB — STAPHYLOCOCCUS SPEC CULT: NORMAL

## 2024-12-02 ENCOUNTER — ANESTHESIA EVENT (OUTPATIENT)
Dept: OPERATING ROOM | Facility: HOSPITAL | Age: 44
End: 2024-12-02
Payer: COMMERCIAL

## 2024-12-02 ENCOUNTER — HOSPITAL ENCOUNTER (OUTPATIENT)
Facility: HOSPITAL | Age: 44
Setting detail: OUTPATIENT SURGERY
Discharge: HOME | End: 2024-12-02
Attending: ORTHOPAEDIC SURGERY | Admitting: ORTHOPAEDIC SURGERY
Payer: COMMERCIAL

## 2024-12-02 ENCOUNTER — APPOINTMENT (OUTPATIENT)
Dept: RADIOLOGY | Facility: HOSPITAL | Age: 44
End: 2024-12-02
Payer: COMMERCIAL

## 2024-12-02 ENCOUNTER — ANESTHESIA (OUTPATIENT)
Dept: OPERATING ROOM | Facility: HOSPITAL | Age: 44
End: 2024-12-02
Payer: COMMERCIAL

## 2024-12-02 VITALS
DIASTOLIC BLOOD PRESSURE: 86 MMHG | BODY MASS INDEX: 25.47 KG/M2 | HEART RATE: 68 BPM | SYSTOLIC BLOOD PRESSURE: 120 MMHG | HEIGHT: 69 IN | RESPIRATION RATE: 18 BRPM | TEMPERATURE: 97.3 F | WEIGHT: 171.96 LBS | OXYGEN SATURATION: 99 %

## 2024-12-02 DIAGNOSIS — Q66.89 TARSAL COALITION OF BOTH FEET: Primary | ICD-10-CM

## 2024-12-02 PROCEDURE — 2500000005 HC RX 250 GENERAL PHARMACY W/O HCPCS: Performed by: ORTHOPAEDIC SURGERY

## 2024-12-02 PROCEDURE — 3600000003 HC OR TIME - INITIAL BASE CHARGE - PROCEDURE LEVEL THREE: Performed by: ORTHOPAEDIC SURGERY

## 2024-12-02 PROCEDURE — 28725 ARTHRODESIS SUBTALAR: CPT | Performed by: ORTHOPAEDIC SURGERY

## 2024-12-02 PROCEDURE — A28725 PR FUSION FOOT BONES,SUBTALAR

## 2024-12-02 PROCEDURE — 64447 NJX AA&/STRD FEMORAL NRV IMG: CPT | Performed by: STUDENT IN AN ORGANIZED HEALTH CARE EDUCATION/TRAINING PROGRAM

## 2024-12-02 PROCEDURE — 7100000010 HC PHASE TWO TIME - EACH INCREMENTAL 1 MINUTE: Performed by: ORTHOPAEDIC SURGERY

## 2024-12-02 PROCEDURE — 76000 FLUOROSCOPY <1 HR PHYS/QHP: CPT | Mod: RT

## 2024-12-02 PROCEDURE — A28725 PR FUSION FOOT BONES,SUBTALAR: Performed by: STUDENT IN AN ORGANIZED HEALTH CARE EDUCATION/TRAINING PROGRAM

## 2024-12-02 PROCEDURE — C1713 ANCHOR/SCREW BN/BN,TIS/BN: HCPCS | Performed by: ORTHOPAEDIC SURGERY

## 2024-12-02 PROCEDURE — 7100000002 HC RECOVERY ROOM TIME - EACH INCREMENTAL 1 MINUTE: Performed by: ORTHOPAEDIC SURGERY

## 2024-12-02 PROCEDURE — 3700000001 HC GENERAL ANESTHESIA TIME - INITIAL BASE CHARGE: Performed by: ORTHOPAEDIC SURGERY

## 2024-12-02 PROCEDURE — 2780000003 HC OR 278 NO HCPCS: Performed by: ORTHOPAEDIC SURGERY

## 2024-12-02 PROCEDURE — 2720000007 HC OR 272 NO HCPCS: Performed by: ORTHOPAEDIC SURGERY

## 2024-12-02 PROCEDURE — C1769 GUIDE WIRE: HCPCS | Performed by: ORTHOPAEDIC SURGERY

## 2024-12-02 PROCEDURE — 2500000005 HC RX 250 GENERAL PHARMACY W/O HCPCS: Performed by: STUDENT IN AN ORGANIZED HEALTH CARE EDUCATION/TRAINING PROGRAM

## 2024-12-02 PROCEDURE — 2500000004 HC RX 250 GENERAL PHARMACY W/ HCPCS (ALT 636 FOR OP/ED)

## 2024-12-02 PROCEDURE — 2500000001 HC RX 250 WO HCPCS SELF ADMINISTERED DRUGS (ALT 637 FOR MEDICARE OP): Performed by: STUDENT IN AN ORGANIZED HEALTH CARE EDUCATION/TRAINING PROGRAM

## 2024-12-02 PROCEDURE — 64445 NJX AA&/STRD SCIATIC NRV IMG: CPT | Performed by: STUDENT IN AN ORGANIZED HEALTH CARE EDUCATION/TRAINING PROGRAM

## 2024-12-02 PROCEDURE — 7100000001 HC RECOVERY ROOM TIME - INITIAL BASE CHARGE: Performed by: ORTHOPAEDIC SURGERY

## 2024-12-02 PROCEDURE — 2500000004 HC RX 250 GENERAL PHARMACY W/ HCPCS (ALT 636 FOR OP/ED): Performed by: STUDENT IN AN ORGANIZED HEALTH CARE EDUCATION/TRAINING PROGRAM

## 2024-12-02 PROCEDURE — 3700000002 HC GENERAL ANESTHESIA TIME - EACH INCREMENTAL 1 MINUTE: Performed by: ORTHOPAEDIC SURGERY

## 2024-12-02 PROCEDURE — 3600000008 HC OR TIME - EACH INCREMENTAL 1 MINUTE - PROCEDURE LEVEL THREE: Performed by: ORTHOPAEDIC SURGERY

## 2024-12-02 PROCEDURE — 7100000009 HC PHASE TWO TIME - INITIAL BASE CHARGE: Performed by: ORTHOPAEDIC SURGERY

## 2024-12-02 DEVICE — SCREW, COMPRESSION FT, 7.0XL, 80MM: Type: IMPLANTABLE DEVICE | Site: FOOT | Status: FUNCTIONAL

## 2024-12-02 DEVICE — GUIDEWIRE, W/ TROCAR TIP, 2.4MM X 23.5CM: Type: IMPLANTABLE DEVICE | Site: FOOT | Status: NON-FUNCTIONAL

## 2024-12-02 DEVICE — BONE, PUTTY DBX  5CC DE-MINERAL ASEPTIC: Type: IMPLANTABLE DEVICE | Site: FOOT | Status: FUNCTIONAL

## 2024-12-02 RX ORDER — FENTANYL CITRATE 50 UG/ML
INJECTION, SOLUTION INTRAMUSCULAR; INTRAVENOUS AS NEEDED
Status: DISCONTINUED | OUTPATIENT
Start: 2024-12-02 | End: 2024-12-02

## 2024-12-02 RX ORDER — SODIUM CHLORIDE 0.9 G/100ML
IRRIGANT IRRIGATION AS NEEDED
Status: DISCONTINUED | OUTPATIENT
Start: 2024-12-02 | End: 2024-12-02 | Stop reason: HOSPADM

## 2024-12-02 RX ORDER — CEFAZOLIN SODIUM 2 G/100ML
2 INJECTION, SOLUTION INTRAVENOUS ONCE
Status: DISCONTINUED | OUTPATIENT
Start: 2024-12-02 | End: 2024-12-02 | Stop reason: HOSPADM

## 2024-12-02 RX ORDER — ROPIVACAINE HYDROCHLORIDE 5 MG/ML
INJECTION, SOLUTION EPIDURAL; INFILTRATION; PERINEURAL AS NEEDED
Status: DISCONTINUED | OUTPATIENT
Start: 2024-12-02 | End: 2024-12-02

## 2024-12-02 RX ORDER — DOCUSATE SODIUM 100 MG/1
100 CAPSULE, LIQUID FILLED ORAL 2 TIMES DAILY
Qty: 28 CAPSULE | Refills: 0 | Status: SHIPPED | OUTPATIENT
Start: 2024-12-02 | End: 2024-12-16

## 2024-12-02 RX ORDER — PHENYLEPHRINE HCL IN 0.9% NACL 0.4MG/10ML
SYRINGE (ML) INTRAVENOUS AS NEEDED
Status: DISCONTINUED | OUTPATIENT
Start: 2024-12-02 | End: 2024-12-02

## 2024-12-02 RX ORDER — OXYCODONE HYDROCHLORIDE 5 MG/1
5 TABLET ORAL EVERY 4 HOURS PRN
Status: DISCONTINUED | OUTPATIENT
Start: 2024-12-02 | End: 2024-12-02 | Stop reason: HOSPADM

## 2024-12-02 RX ORDER — MIDAZOLAM HYDROCHLORIDE 1 MG/ML
INJECTION INTRAMUSCULAR; INTRAVENOUS AS NEEDED
Status: DISCONTINUED | OUTPATIENT
Start: 2024-12-02 | End: 2024-12-02

## 2024-12-02 RX ORDER — CEFAZOLIN 1 G/1
INJECTION, POWDER, FOR SOLUTION INTRAVENOUS AS NEEDED
Status: DISCONTINUED | OUTPATIENT
Start: 2024-12-02 | End: 2024-12-02

## 2024-12-02 RX ORDER — METHOCARBAMOL 100 MG/ML
INJECTION, SOLUTION INTRAMUSCULAR; INTRAVENOUS AS NEEDED
Status: DISCONTINUED | OUTPATIENT
Start: 2024-12-02 | End: 2024-12-02

## 2024-12-02 RX ORDER — ONDANSETRON HYDROCHLORIDE 2 MG/ML
4 INJECTION, SOLUTION INTRAVENOUS ONCE AS NEEDED
Status: DISCONTINUED | OUTPATIENT
Start: 2024-12-02 | End: 2024-12-02 | Stop reason: HOSPADM

## 2024-12-02 RX ORDER — LABETALOL HYDROCHLORIDE 5 MG/ML
5 INJECTION, SOLUTION INTRAVENOUS ONCE AS NEEDED
Status: DISCONTINUED | OUTPATIENT
Start: 2024-12-02 | End: 2024-12-02 | Stop reason: HOSPADM

## 2024-12-02 RX ORDER — SODIUM CHLORIDE, SODIUM LACTATE, POTASSIUM CHLORIDE, CALCIUM CHLORIDE 600; 310; 30; 20 MG/100ML; MG/100ML; MG/100ML; MG/100ML
20 INJECTION, SOLUTION INTRAVENOUS CONTINUOUS
Status: DISCONTINUED | OUTPATIENT
Start: 2024-12-02 | End: 2024-12-02 | Stop reason: HOSPADM

## 2024-12-02 RX ORDER — PROPOFOL 10 MG/ML
INJECTION, EMULSION INTRAVENOUS AS NEEDED
Status: DISCONTINUED | OUTPATIENT
Start: 2024-12-02 | End: 2024-12-02

## 2024-12-02 RX ORDER — OXYCODONE HYDROCHLORIDE 5 MG/1
10 TABLET ORAL EVERY 4 HOURS PRN
Status: DISCONTINUED | OUTPATIENT
Start: 2024-12-02 | End: 2024-12-02 | Stop reason: HOSPADM

## 2024-12-02 RX ORDER — ACETAMINOPHEN 325 MG/1
650 TABLET ORAL EVERY 4 HOURS PRN
Status: DISCONTINUED | OUTPATIENT
Start: 2024-12-02 | End: 2024-12-02 | Stop reason: HOSPADM

## 2024-12-02 RX ORDER — LIDOCAINE HYDROCHLORIDE 10 MG/ML
0.1 INJECTION, SOLUTION EPIDURAL; INFILTRATION; INTRACAUDAL; PERINEURAL ONCE
Status: DISCONTINUED | OUTPATIENT
Start: 2024-12-02 | End: 2024-12-02 | Stop reason: HOSPADM

## 2024-12-02 RX ORDER — SODIUM CHLORIDE, SODIUM LACTATE, POTASSIUM CHLORIDE, CALCIUM CHLORIDE 600; 310; 30; 20 MG/100ML; MG/100ML; MG/100ML; MG/100ML
100 INJECTION, SOLUTION INTRAVENOUS CONTINUOUS
Status: ACTIVE | OUTPATIENT
Start: 2024-12-02 | End: 2024-12-02

## 2024-12-02 RX ORDER — NAPROXEN SODIUM 220 MG/1
81 TABLET, FILM COATED ORAL 2 TIMES DAILY
Qty: 28 TABLET | Refills: 0 | Status: SHIPPED | OUTPATIENT
Start: 2024-12-02 | End: 2024-12-16

## 2024-12-02 RX ORDER — ONDANSETRON HYDROCHLORIDE 2 MG/ML
INJECTION, SOLUTION INTRAVENOUS AS NEEDED
Status: DISCONTINUED | OUTPATIENT
Start: 2024-12-02 | End: 2024-12-02

## 2024-12-02 RX ORDER — OXYCODONE HYDROCHLORIDE 5 MG/1
5 TABLET ORAL EVERY 6 HOURS PRN
Qty: 28 TABLET | Refills: 0 | Status: SHIPPED | OUTPATIENT
Start: 2024-12-02 | End: 2024-12-04 | Stop reason: SDUPTHER

## 2024-12-02 SDOH — HEALTH STABILITY: MENTAL HEALTH: CURRENT SMOKER: 0

## 2024-12-02 ASSESSMENT — PAIN - FUNCTIONAL ASSESSMENT
PAIN_FUNCTIONAL_ASSESSMENT: 0-10
PAIN_FUNCTIONAL_ASSESSMENT: UNABLE TO SELF-REPORT
PAIN_FUNCTIONAL_ASSESSMENT: 0-10
PAIN_FUNCTIONAL_ASSESSMENT: UNABLE TO SELF-REPORT
PAIN_FUNCTIONAL_ASSESSMENT: 0-10

## 2024-12-02 ASSESSMENT — PAIN SCALES - GENERAL
PAINLEVEL_OUTOF10: 0 - NO PAIN
PAINLEVEL_OUTOF10: 0 - NO PAIN
PAINLEVEL_OUTOF10: 8
PAIN_LEVEL: 0

## 2024-12-02 ASSESSMENT — PAIN DESCRIPTION - DESCRIPTORS: DESCRIPTORS: SHARP

## 2024-12-02 NOTE — H&P
"History Of Present Illness  Noe Avilez is a 44 y.o. male presenting with R subtalar coalition and pain. Here for R subtalar fusion.      Past Medical History  He has a past medical history of Anxiety, Flat foot, HLD (hyperlipidemia), Joint pain, Peroneal tendonitis, Tarsal coalition of both feet, and Vitamin D deficiency.    Surgical History  He has a past surgical history that includes Ankle fracture surgery (Left, 2009); Lipoma resection (01/2024); Leg Tendon Surgery (Left, 2011); Vasectomy (2011); and Ankle surgery (Left, 08/2024).     Social History  He reports that he has never smoked. He has never used smokeless tobacco. He reports that he does not currently use alcohol. He reports that he does not use drugs.    Family History  Family History   Problem Relation Name Age of Onset    Other (HLD) Mother      Sarcoidosis Father      No Known Problems Sister      Cancer Maternal Grandfather Edi Avilez     Cancer Mother's Sister Kassi García         Allergies  Naproxen, Sulfamethoxazole-trimethoprim, and Vancomycin    Review of Systems   All other systems reviewed and are negative.    Exam:    Constitutional: NAD  HEENT: hearing and vision grossly intact, MMM  Resp: breathing comfortably   CV: extremities warm, well perfused  Neuro: alert, sensory and motor grossly intact  Psych: Appropriate mood and affect     Right Lower Extremity:   -Skin intact  -Painful w/ foot inversion/eversion   -Fires DF/PF/EHL/FHL  -SILT in saph/sural/SPN/DPN distributions  -Foot warm, well perfused  -Palpable DP pulse, brisk cap refill  -Compartments soft and compressible       Last Recorded Vitals  Blood pressure (!) 134/92, pulse 73, temperature 36 °C (96.8 °F), temperature source Temporal, resp. rate 16, height 1.75 m (5' 8.9\"), weight 78 kg (171 lb 15.3 oz), SpO2 97%.    Relevant Results      Scheduled medications  ceFAZolin, 2 g, intravenous, Once      Continuous medications  lactated Ringer's, 20 mL/hr      PRN medications    No " results found for this or any previous visit (from the past 24 hours).    Assessment/Plan   Assessment & Plan  Tarsal coalition of both feet      44M w/ R subtalar coalition here for R subtalar fusion. Patient agreeable to proceed with surgery. Consent reviewed and signed.            Lauri Koehler MD     TRAUMA ACTIVATION LEVEL:  CONSULT    HPI: 84-year-old female past med history of GERD, hypertension, osteoarthritis, rheumatoid arthritis, breast cancer in remission since 2015, past surgical history of bilateral hip replacements, and knee replacement presenting for a fall. Hx taken from the patient and is aox3 at time of the interview. Patient states that this morning around 10 am , she was trying to reach out from chair to another chair, while in sitting position, and while stretching she lost her balance and fell to the ground on her right side, hitting her head against the ground. She further states that, after the fall she was unable to get up from the ground and hence she was lying on the floor for many hours ( around 8 hr ) until her friend came to her house who saw her on the ground, and called the 911. After the fall, she is complaining of right lower extremity pain and is unable to move her right leg due to pain. on further inquiry,  patient had a fall the week prior wherein she landed on her face, she was seen in the ED at that time and found no significant injuries and was discharged to home. She has been using a walker since that time for stability. Also of note patient had a L forefoot exostosis removal with podiatry approximately two weeks prior but has been WBAT on that side.  Patient denies headache, loss of consciousness neck pain, dizziness, chest pain, palpitations  shortness of breath, chest pain, nausea, vomiting , urinary symptoms, and other significant complaints.     PAST MEDICAL & SURGICAL HISTORY:  Rheumatoid arthritis      HTN (hypertension)      Breast cancer  last radiation 2015      Depression      Chronic GERD      OA (osteoarthritis)      Rheumatoid arthritis      History of right hip replacement      Status post left hip replacement      S/P total knee replacement, left      H/O vaginal hysterectomy      Status post cholecystectomy      S/P lumpectomy of breast  2015          Allergies    No Known Allergies    Intolerances        Home Medications:  Calcium 600+D 600 mg-200 intl units oral tablet: 1 tab(s) orally 2 times a day (02 Dec 2024 02:06)  celecoxib 200 mg oral capsule: 1 cap(s) orally 2 times a day (02 Dec 2024 02:05)  famotidine 20 mg oral tablet: 1 tab(s) orally 2 times a day (02 Dec 2024 02:06)  folic acid 1 mg oral tablet: 1 tab(s) orally 2 times a day (02 Dec 2024 02:06)  hydroCHLOROthiazide 50 mg oral tablet: 1 tab(s) orally once a day (02 Dec 2024 02:06)  losartan 25 mg oral tablet: 1 tab(s) orally (02 Dec 2024 02:06)  Low Dose ASA 81 mg oral tablet: 1 tab(s) orally once a day (02 Dec 2024 02:06)  metoprolol succinate 25 mg oral tablet, extended release: 1 tab(s) orally once a day (at bedtime) (02 Dec 2024 02:06)  predniSONE 10 mg oral tablet: 1 tab(s) orally (02 Dec 2024 02:06)  rosuvastatin 10 mg oral tablet: 1 tab(s) orally once a day (02 Dec 2024 02:06)  Tylenol 325 mg oral tablet: 2 tab(s) orally every 6 hours as needed for  mild pain (02 Dec 2024 02:06)  Vitamin C: 1000 milligram(s) orally once a day (02 Dec 2024 02:06)  Vitamin D3 1000 intl units oral tablet: 2 tab(s) orally once a day (02 Dec 2024 02:06)  Zoloft 50 mg oral tablet: orally once a day (at bedtime) (02 Dec 2024 02:06)    ROS: 10-system review is otherwise negative except HPI above.      Vital Signs Last 24 Hrs  T(C): 36.3 (02 Dec 2024 04:43), Max: 36.7 (02 Dec 2024 00:15)  T(F): 97.4 (02 Dec 2024 04:43), Max: 98 (02 Dec 2024 00:15)  HR: 74 (02 Dec 2024 04:43) (74 - 92)  BP: 118/77 (02 Dec 2024 04:43) (118/77 - 158/96)  BP(mean): --  RR: 18 (02 Dec 2024 04:43) (18 - 19)  SpO2: 99% (02 Dec 2024 00:15) (99% - 99%)    Parameters below as of 01 Dec 2024 19:39  Patient On (Oxygen Delivery Method): room air      Labs:  CAPILLARY BLOOD GLUCOSE      POCT Blood Glucose.: 117 mg/dL (01 Dec 2024 19:57)                          12.5   13.52 )-----------( 277      ( 01 Dec 2024 20:12 )             37.8       Auto Neutrophil %: 78.9 % (12-01-24 @ 20:12)  Auto Immature Granulocyte %: 0.7 % (12-01-24 @ 20:12)    12-02    132[L]  |  92[L]  |  27[H]  ----------------------------<  131[H]  4.8   |  25  |  1.0      Calcium: 9.7 mg/dL (12-02-24 @ 01:59)      LFTs:             6.8  | 0.7  | 36       ------------------[87      ( 01 Dec 2024 20:12 )  4.2  | x    | 23          Lipase:14     Amylase:x         Lactate, Blood: 2.8 mmol/L (12-02-24 @ 01:59)  Lactate, Blood: 3.2 mmol/L (12-01-24 @ 20:12)      Coags:     11.20  ----< 0.95    ( 01 Dec 2024 20:12 )     27.0       Alcohol, Blood: <10 mg/dL (12-01-24 @ 20:12)    Urinalysis Basic - ( 02 Dec 2024 01:59 )    Color: x / Appearance: x / SG: x / pH: x  Gluc: 131 mg/dL / Ketone: x  / Bili: x / Urobili: x   Blood: x / Protein: x / Nitrite: x   Leuk Esterase: x / RBC: x / WBC x   Sq Epi: x / Non Sq Epi: x / Bacteria: x    Alcohol, Blood: <10 mg/dL (12-01-24 @ 20:12)      RADIOLOGY & ADDITIONAL STUDIES:  < from: CT Femur No Cont, Right (12.01.24 @ 21:08) >  IMPRESSION:  Acute comminuted right femoral distal metadiaphyseal fracture with volar   apex angulation and 1.5 cm posterior displacement.    Small knee joint effusion.    < end of copied text >

## 2024-12-02 NOTE — OP NOTE
Right Foot Subtalar Joint Arthrodesis (R) Operative Note     Date: 2024  OR Location: [facility] A OR    Name: Noe Avilez : 1980, Age: 44 y.o., MRN: [id number], Sex: male    Diagnosis  Pre-op Diagnosis      * Tarsal coalition of both feet [Q66.89] Post-op Diagnosis     * Tarsal coalition of both feet [Q66.89]     Procedures  Right Foot Subtalar Joint Arthrodesis  06416 - FL ARTHRODESIS SUBTALAR      Surgeons      * Matt Foley - Primary    Resident/Fellow/Other Assistant:  Surgeons and Role:     * Lauri Koehler MD - Resident - Assisting    Staff:   Circulator: Linda Paz Scrub: Radha  Scrub Person: Michael  Circulator: Coco    Anesthesia Staff: Anesthesiologist: Sebas Alvarado DO  CRNA: Hernandez DE LOS SANTOS APRN-CRNA  C-AA: NI Cage    Procedure Summary  Anesthesia: General  ASA: II  Estimated Blood Loss: 0 mL  Intra-op Medications:   Administrations occurring from 1230 to 1430 on 24:   Medication Name Total Dose   sodium chloride 0.9 % irrigation solution 1,000 mL   dexAMETHasone (Decadron) injection 4 mg/mL 4 mg   dexmedeTOMIDine (Precedex) bolus from bag 16 mcg   ondansetron (Zofran) 2 mg/mL injection 4 mg   phenylephrine 40 mcg/mL syringe 10 mL 100 mcg   propofol (Diprivan) injection 10 mg/mL 50 mg              Anesthesia Record               Intraprocedure I/O Totals          Intake    Dexmedetomidine 0.00 mL    The total shown is the total volume documented since Anesthesia Start was filed.    Total Intake 0 mL          Specimen: No specimens collected              Drains and/or Catheters: * None in log *    Tourniquet Times:   * Missing tourniquet times found for documented tourniquets in lo *     Implants:     Findings: Middle facet coalition.    Indications: Noe Avilez is an 44 y.o. male who is having surgery for Tarsal coalition of both feet [Q66.89].  Middle facet coalition with painful subtalar joint.  Short-term relief with the diagnostic injection.  Here for  surgical fusion.    The patient was seen in the preoperative area. The risks, benefits, complications, treatment options, non-operative alternatives, expected recovery and outcomes were discussed with the patient. The possibilities of reaction to medication, pulmonary aspiration, injury to surrounding structures, bleeding, recurrent infection, the need for additional procedures, failure to diagnose a condition, and creating a complication requiring transfusion or operation were discussed with the patient. The patient concurred with the proposed plan, giving informed consent.  The site of surgery was properly noted/marked if necessary per policy. The patient has been actively warmed in preoperative area. Preoperative antibiotics have been ordered and given within 1 hours of incision. Venous thrombosis prophylaxis are not indicated.    Procedure Details:   Preop DX:   Right subtalar middle facet coalition  Postop DX: Same  Procedure: Right subtalar arthrodesis  Surgeon: Matt Foley MD  Asst: Lauri Koehler MD, Adriano Anand DO  Anesthesia: General and regional  Clinical Note: 44 year-old male with the above diagnoses.  Underwent similar subtalar fusion outside facility on the left.  Short-term relief with diagnostic injection on the right.  Here for subtalar fusion.  Failed conservative treatment.  Daily functional pain.  Here for subtalar arthrodesis.  Understood risk of surgery including but not limited to bleeding, infection, nonunion, malunion, wound healing problems, DVT, possible need for further surgery or bracing.  Understood these risks and wished to proceed.  Procedure Note: Patient brought to operating room after regional anesthesia.  Timeout performed.  Antibiotics given IV.   General anesthesia given.  Right leg prepped and draped in typical sterile fashion with tourniquet on the thigh.  Leg was exsanguinated and calf tourniquet was laying to 325 mmHg.  Longitudinal incision made over the sinus Tarsi  laterally.  The  subtalar joint was exposed and prepared for fusion using combination of rongeur, curette, osteotome and bur.  Good bleeding bone was exposed.  Posterior facet was packed with combination of allograft bone chips and demineralized bone matrix.  Subtalar joint was fused in situ with the subtalar joint in 5 degrees of valgus.  Subtalar joint was fixed with 2 crossed headless screws through small anterior and posterior incisions.  Wounds were irrigated.  Wounds were closed in layers.  Dressed with posterior splint and sugar tong.  Patient tolerated procedure well and taken recovery room in stable condition.  Complications:  None; patient tolerated the procedure well.    Disposition: PACU - hemodynamically stable.  Condition: stable                 Additional Details: None    Attending Attestation: I was present and scrubbed for the entire procedure.    Matt Foley  Phone Number: [phone]

## 2024-12-02 NOTE — ANESTHESIA PROCEDURE NOTES
Peripheral Block    Patient location during procedure: pre-op  Start time: 12/2/2024 11:38 AM  End time: 12/2/2024 11:48 AM  Reason for block: at surgeon's request and post-op pain management  Staffing  Performed: attending   Authorized by: Sebas Alvarado DO    Performed by: Sebas Alvarado DO  Preanesthetic Checklist  Completed: patient identified, IV checked, site marked, risks and benefits discussed, surgical consent, monitors and equipment checked, pre-op evaluation and timeout performed   Timeout performed at: 12/2/2024 11:37 AM  Peripheral Block  Patient position: sitting  Prep: ChloraPrep  Patient monitoring: heart rate, cardiac monitor and continuous pulse ox  Block type: adductor canal and popliteal  Laterality: right  Injection technique: single-shot  Guidance: ultrasound guided  Infiltration strength: 0.5 %  Dose: 40 mL  Needle  Needle gauge: 22 G  Needle length: 8 cm  Needle localization: nerve stimulator  Test dose: negative  Assessment  Injection assessment: negative aspiration for heme, no paresthesia on injection, incremental injection and local visualized surrounding nerve on ultrasound  Heart rate change: no  Slow fractionated injection: yes  Additional Notes  20 ml deposited at popliteal. Nerve response visualized with 0.5 mA on nerve stim, with cessation following injection of local anesthetic. 20 ml visualized for addutor canal block with no resistance to injection. Patient without sensation over medial malleolus 10 min after procedure, with no plantar flexion indicating successful block

## 2024-12-02 NOTE — ANESTHESIA POSTPROCEDURE EVALUATION
Patient: Noe Avilez    Procedure Summary       Date: 12/02/24 Room / Location: [facility] A OR 05 / [facility] OR    Anesthesia Start: 1207 Anesthesia Stop: 1355    Procedure: Right Foot Subtalar Joint Arthrodesis (Right: Foot) Diagnosis:       Tarsal coalition of both feet      (Tarsal coalition of both feet [Q66.89])    Surgeons: Matt Foley MD Responsible Provider: Bruce Martinez MD    Anesthesia Type: general ASA Status: 2            Anesthesia Type: general    Vitals Value Taken Time   /75 12/02/24 1400   Temp 36.3 °C (97.3 °F) 12/02/24 1351   Pulse 63 12/02/24 1400   Resp 12 12/02/24 1400   SpO2 98 % 12/02/24 1400       Anesthesia Post Evaluation    Patient location during evaluation: bedside  Patient participation: complete - patient cannot participate  Level of consciousness: awake  Pain score: 0  Pain management: adequate  Airway patency: patent  Cardiovascular status: acceptable and hemodynamically stable  Respiratory status: acceptable and nonlabored ventilation  Hydration status: acceptable  Postoperative Nausea and Vomiting: none        There were no known notable events for this encounter.

## 2024-12-02 NOTE — ANESTHESIA PREPROCEDURE EVALUATION
"Patient: Noe Avilez    Procedure Information       Date/Time: 12/02/24 1230    Procedure: Right Foot Subtalar Joint Arthrodesis (Right: Foot)    Location: [facility] OR 05 / [facility] OR    Surgeons: Matt Foley MD        HPI: 44 year old male presenting for right subtalar joint arthrodesis. History significant for ankle pain, otherwise healthy    Denies recent cough, cold, runny nose, fever, flu like symptoms. No exertional angina nor exertional dyspnea. No orthopnea, paroxysmal nocturnal dyspnea, leg swelling. Denies palpitations. No issues with bleeding nor blood clots.    Anesthesia history: no issues    Visit Vitals  BP (!) 134/92   Pulse 73   Temp 36 °C (96.8 °F) (Temporal)   Resp 16   Ht 1.75 m (5' 8.9\")   Wt 78 kg (171 lb 15.3 oz)   SpO2 97%   BMI 25.47 kg/m²   Smoking Status Never   BSA 1.95 m²        [unfilled]      No results found for this or any previous visit from the past 1095 days.       No results found for this or any previous visit (from the past 4464 hours).     Stress test: none      Relevant Problems   No relevant active problems       Clinical information reviewed:   Tobacco  Allergies  Meds   Med Hx  Surg Hx   Fam Hx  Soc Hx        NPO Detail:  NPO/Void Status  Carbohydrate Drink Given Prior to Surgery? : N  Date of Last Liquid: 12/02/24  Time of Last Liquid: 0000  Date of Last Solid: 12/01/24  Time of Last Solid: 1930  Last Intake Type: Clear fluids (water)  Time of Last Void: 1000         Physical Exam    Airway  Mallampati: II  TM distance: >3 FB  Neck ROM: full     Cardiovascular   Rhythm: regular  Rate: normal     Dental - normal exam     Pulmonary   Breath sounds clear to auscultation     Abdominal - normal exam             Anesthesia Plan    History of general anesthesia?: yes  History of complications of general anesthesia?: no    ASA 2     general     The patient is not a current smoker.    intravenous induction   Postoperative administration of opioids is intended.  Trial " extubation is planned.  Anesthetic plan and risks discussed with patient.  Use of blood products discussed with patient who consented to blood products.    Plan discussed with CRNA.

## 2024-12-02 NOTE — ANESTHESIA PROCEDURE NOTES
Airway  Date/Time: 12/2/2024 12:19 PM  Urgency: elective    Airway not difficult    Staffing  Performed: CAA   Authorized by: Sebas Alvarado DO    Performed by: KYRA Massey  Patient location during procedure: OR    Indications and Patient Condition  Indications for airway management: anesthesia  Spontaneous ventilation: present  Sedation level: deep  Preoxygenated: yes  Patient position: sniffing  MILS maintained throughout  Mask difficulty assessment: 1 - vent by mask    Final Airway Details  Final airway type: supraglottic airway      Successful airway: Size 4     Number of attempts at approach: 1

## 2024-12-02 NOTE — DISCHARGE INSTRUCTIONS
Additional instructions  Ice/Elevate operative extremity.  Keep dressing clean and dry.    Keep dressing in place.  Weightbearing: NWB on operative extremity with crutches/walker.   Start Tylenol 650 mg by mouth every 6 hours today as needed for pain.  Oxycodone 1 by mouth every 6 hours as needed for pain when block wears off.  Aspirin 81 mg by mouth twice daily.  Colace twice a day for constipation.  F/U with Dr. Foley in 2 weeks.  Call [phone] for appointment time.

## 2024-12-02 NOTE — NURSING NOTE
1505- Pt assisted with dressing with help of family. IV removed dressing applied. Discharge instructions provided to pt and family. Procedural report reviewed. Educated effects of anesthesia, homecoming care following procedure. Pt and family verbalizing understanding of all instructions provided at this time. All questions and concerns answered. Pt given contact information for provider.       1052- Pt assisted to main lobby via  by transport. Dc in stable condition to home. All belongings taken with pt.

## 2024-12-04 DIAGNOSIS — Q66.89 TARSAL COALITION OF BOTH FEET: ICD-10-CM

## 2024-12-04 RX ORDER — OXYCODONE HYDROCHLORIDE 5 MG/1
5 TABLET ORAL EVERY 6 HOURS PRN
Qty: 28 TABLET | Refills: 0 | Status: SHIPPED | OUTPATIENT
Start: 2024-12-04 | End: 2024-12-11

## 2024-12-16 ENCOUNTER — OFFICE VISIT (OUTPATIENT)
Dept: ORTHOPEDIC SURGERY | Facility: CLINIC | Age: 44
End: 2024-12-16
Payer: COMMERCIAL

## 2024-12-16 DIAGNOSIS — Q66.89 TARSAL COALITION OF BOTH FEET: Primary | ICD-10-CM

## 2024-12-16 PROCEDURE — 1036F TOBACCO NON-USER: CPT | Performed by: ORTHOPAEDIC SURGERY

## 2024-12-16 PROCEDURE — 99211 OFF/OP EST MAY X REQ PHY/QHP: CPT | Performed by: ORTHOPAEDIC SURGERY

## 2024-12-16 NOTE — PROGRESS NOTES
S: Pain well controlled.     O: Incisions healing nicely. Good alignment.    XR: I personally reviewed the following radiographic exams: Right heel IntraOp shows subtalar fusion with 2 screws.    A: S/P right subtalar fusion    P: Ace wrap.  Cast boot.  Nonweightbearing.  Follow-up right heel 4 weeks.

## 2024-12-30 ENCOUNTER — OFFICE VISIT (OUTPATIENT)
Dept: ORTHOPEDIC SURGERY | Facility: CLINIC | Age: 44
End: 2024-12-30
Payer: COMMERCIAL

## 2024-12-30 ENCOUNTER — HOSPITAL ENCOUNTER (OUTPATIENT)
Dept: RADIOLOGY | Facility: CLINIC | Age: 44
Discharge: HOME | End: 2024-12-30
Payer: COMMERCIAL

## 2024-12-30 DIAGNOSIS — Q66.89 TARSAL COALITION OF BOTH FEET: Primary | ICD-10-CM

## 2024-12-30 DIAGNOSIS — Q66.89 TARSAL COALITION OF BOTH FEET: ICD-10-CM

## 2024-12-30 DIAGNOSIS — M25.572 SINUS TARSI SYNDROME OF BOTH ANKLES: ICD-10-CM

## 2024-12-30 DIAGNOSIS — M25.571 SINUS TARSI SYNDROME OF BOTH ANKLES: ICD-10-CM

## 2024-12-30 PROCEDURE — 73650 X-RAY EXAM OF HEEL: CPT | Mod: RT

## 2024-12-30 PROCEDURE — 73650 X-RAY EXAM OF HEEL: CPT | Mod: RIGHT SIDE | Performed by: STUDENT IN AN ORGANIZED HEALTH CARE EDUCATION/TRAINING PROGRAM

## 2024-12-30 PROCEDURE — 1036F TOBACCO NON-USER: CPT | Performed by: ORTHOPAEDIC SURGERY

## 2024-12-30 PROCEDURE — 99211 OFF/OP EST MAY X REQ PHY/QHP: CPT | Performed by: ORTHOPAEDIC SURGERY

## 2024-12-30 ASSESSMENT — PAIN SCALES - GENERAL: PAINLEVEL_OUTOF10: 2

## 2024-12-30 ASSESSMENT — PAIN DESCRIPTION - DESCRIPTORS: DESCRIPTORS: SORE

## 2024-12-30 ASSESSMENT — PAIN - FUNCTIONAL ASSESSMENT: PAIN_FUNCTIONAL_ASSESSMENT: 0-10

## 2024-12-30 NOTE — PROGRESS NOTES
S: Pain well controlled.  Not sleeping in boot.  Some discomfort at the heel incision.    O: Incisions healing nicely. Good alignment.    XR: I personally reviewed the following radiographic exams: Right heel shows healing subtalar fusion.  Stable hardware.    A: S/P right subtalar fusion.    P: Remain nonweightbearing for 2 more weeks.  Progress weightbearing boot at that time.  Follow-up x-ray right heel in 3 weeks.

## 2025-01-20 ENCOUNTER — OFFICE VISIT (OUTPATIENT)
Dept: ORTHOPEDIC SURGERY | Facility: CLINIC | Age: 45
End: 2025-01-20
Payer: COMMERCIAL

## 2025-01-20 ENCOUNTER — HOSPITAL ENCOUNTER (OUTPATIENT)
Dept: RADIOLOGY | Facility: CLINIC | Age: 45
Discharge: HOME | End: 2025-01-20
Payer: COMMERCIAL

## 2025-01-20 DIAGNOSIS — M25.572 SINUS TARSI SYNDROME OF BOTH ANKLES: ICD-10-CM

## 2025-01-20 DIAGNOSIS — Q66.89 TARSAL COALITION OF BOTH FEET: ICD-10-CM

## 2025-01-20 DIAGNOSIS — M25.571 SINUS TARSI SYNDROME OF BOTH ANKLES: ICD-10-CM

## 2025-01-20 DIAGNOSIS — Q66.89 TARSAL COALITION OF BOTH FEET: Primary | ICD-10-CM

## 2025-01-20 PROCEDURE — 99211 OFF/OP EST MAY X REQ PHY/QHP: CPT | Performed by: ORTHOPAEDIC SURGERY

## 2025-01-20 PROCEDURE — 73650 X-RAY EXAM OF HEEL: CPT | Mod: RT

## 2025-01-20 PROCEDURE — 73650 X-RAY EXAM OF HEEL: CPT | Mod: RIGHT SIDE | Performed by: RADIOLOGY

## 2025-01-20 PROCEDURE — 1036F TOBACCO NON-USER: CPT | Performed by: ORTHOPAEDIC SURGERY

## 2025-01-20 ASSESSMENT — PAIN SCALES - GENERAL: PAINLEVEL_OUTOF10: 3

## 2025-01-20 ASSESSMENT — PAIN DESCRIPTION - DESCRIPTORS: DESCRIPTORS: SORE

## 2025-01-20 ASSESSMENT — PAIN - FUNCTIONAL ASSESSMENT: PAIN_FUNCTIONAL_ASSESSMENT: 0-10

## 2025-01-20 NOTE — PROGRESS NOTES
Returns right foot.  Pretty comfortable walking in the boot with crutches for support.  Most the pain around the inferior heel incision.    Exam: No pain with ankle motion.  No crepitus.  Tender inferior scar posterior heel.    I personally reviewed the following radiographic exams: Right heel shows healing subtalar fusion.  Stable hardware.    Assessment: Status post right subtalar fusion.    Transition to shoewear with crutches for support.  Follow-up x-ray in 1 month.  Okay to drive when comfortable.

## 2025-02-17 ENCOUNTER — OFFICE VISIT (OUTPATIENT)
Dept: ORTHOPEDIC SURGERY | Facility: CLINIC | Age: 45
End: 2025-02-17
Payer: COMMERCIAL

## 2025-02-17 ENCOUNTER — HOSPITAL ENCOUNTER (OUTPATIENT)
Dept: RADIOLOGY | Facility: CLINIC | Age: 45
Discharge: HOME | End: 2025-02-17
Payer: COMMERCIAL

## 2025-02-17 DIAGNOSIS — Q66.89 TARSAL COALITION OF BOTH FEET: ICD-10-CM

## 2025-02-17 DIAGNOSIS — Q66.89 TARSAL COALITION OF BOTH FEET: Primary | ICD-10-CM

## 2025-02-17 PROCEDURE — 99211 OFF/OP EST MAY X REQ PHY/QHP: CPT | Performed by: ORTHOPAEDIC SURGERY

## 2025-02-17 PROCEDURE — 73650 X-RAY EXAM OF HEEL: CPT | Mod: RIGHT SIDE | Performed by: RADIOLOGY

## 2025-02-17 PROCEDURE — 1036F TOBACCO NON-USER: CPT | Performed by: ORTHOPAEDIC SURGERY

## 2025-02-17 PROCEDURE — 73650 X-RAY EXAM OF HEEL: CPT | Mod: RT

## 2025-02-17 ASSESSMENT — PAIN DESCRIPTION - DESCRIPTORS: DESCRIPTORS: SORE

## 2025-02-17 ASSESSMENT — PAIN - FUNCTIONAL ASSESSMENT: PAIN_FUNCTIONAL_ASSESSMENT: 0-10

## 2025-02-17 ASSESSMENT — PAIN SCALES - GENERAL: PAINLEVEL_OUTOF10: 3

## 2025-02-17 NOTE — PROGRESS NOTES
No pain in shoes.  Wants to progress activities.    Exam: Good ankle motion.  No pain with hindfoot stress.  Minimal swelling.    I personally reviewed the following radiographic exams: Right heel shows healing subtalar fusion.  Stable hardware.    Assessment: Status post right subtalar fusion.    Plan: No restrictions.  Progress activity as tolerated.  Follow-up x-ray right heel in 2 months.

## 2025-04-16 ENCOUNTER — HOSPITAL ENCOUNTER (OUTPATIENT)
Dept: RADIOLOGY | Facility: CLINIC | Age: 45
Discharge: HOME | End: 2025-04-16
Payer: COMMERCIAL

## 2025-04-16 ENCOUNTER — APPOINTMENT (OUTPATIENT)
Dept: ORTHOPEDIC SURGERY | Facility: CLINIC | Age: 45
End: 2025-04-16
Payer: COMMERCIAL

## 2025-04-16 DIAGNOSIS — Q66.89 TARSAL COALITION OF BOTH FEET: Primary | ICD-10-CM

## 2025-04-16 DIAGNOSIS — Q66.89 TARSAL COALITION OF BOTH FEET: ICD-10-CM

## 2025-04-16 PROCEDURE — 1036F TOBACCO NON-USER: CPT | Performed by: ORTHOPAEDIC SURGERY

## 2025-04-16 PROCEDURE — 73650 X-RAY EXAM OF HEEL: CPT | Mod: RT

## 2025-04-16 PROCEDURE — 99213 OFFICE O/P EST LOW 20 MIN: CPT | Performed by: ORTHOPAEDIC SURGERY

## 2025-04-16 NOTE — PROGRESS NOTES
Returns for right heel.  Really having no pain.  Gets some discomfort after a long walk along the lateral hindfoot.    Exam: Good ankle motion.  No pain with midfoot motion.  No apparent subtalar motion.    I personally reviewed the following radiographic exams: Right heel shows healed subtalar fusion.  Stable hardware.    Assessment: Status post right subtalar fusion.    Plan: Work on resistive exercises.  Work on strength and endurance.  No restrictions.  Follow-up as needed.

## (undated) DEVICE — DRESSING, GAUZE, 16 PLY, 4 X 4 IN, STERILE

## (undated) DEVICE — Device

## (undated) DEVICE — SYRINGE, 20 CC, LUER LOCK, MONOJECT, W/O CAP, LF

## (undated) DEVICE — SUTURE, VICRYL, 0, 27 IN, CT-2, UNDYED

## (undated) DEVICE — GLOVE, SURGICAL, PROTEXIS PI BLUE W/NEUTHERA, 6.5, PF, LF

## (undated) DEVICE — COVER, MAYO STAND, W/PAD, 23 IN, DISPOSABLE, PLASTIC, LF, STERILE

## (undated) DEVICE — GOWN, ASTOUND, L

## (undated) DEVICE — SOLUTION, IRRIGATION, SODIUM CHLORIDE 0.9%, 1000 ML, POUR BOTTLE

## (undated) DEVICE — GLOVE, SURGICAL, PROTEXIS PI MICRO, 8.0, PF, LF

## (undated) DEVICE — CATHETER, IV, ANGIOCATH, 14 G X 1.88 IN, FEP POLYMER

## (undated) DEVICE — TIP, SUCTION, YANKAUER, FLEXIBLE

## (undated) DEVICE — BLADE, LONG MEDIUM 25 X 9

## (undated) DEVICE — SUTURE, ETHILON, 3-0, 18 IN, PS2, BLACK

## (undated) DEVICE — SUTURE, MONOCRYL, 4-0, 18 IN, PS2, UNDYED

## (undated) DEVICE — DX SWIVELOCK SL, W/ FORKED EYELET, 3.5 X 8.5MM

## (undated) DEVICE — PADDING, UNDERCAST, WEBRIL, 4 IN X 4 YD, REG, NS

## (undated) DEVICE — CUFF, TOURNIQUET, 18 X 4, DUAL PORT/SNGL BLADDER, DISP, LF

## (undated) DEVICE — CLOSURE SYSTEM, DERMABOND, PRINEO, 22CM, STERILE

## (undated) DEVICE — DRAPE MINI, C-ARM, W/POLY STRAPS, 54 X 84 IN

## (undated) DEVICE — DRESSING, NON-ADHERENT, OIL EMULSION, CURITY, 3 X 8 IN, STERILE

## (undated) DEVICE — SUTURE, VICRYL, 0, SH 27 TAPER NEEDLE, UNDYED, BRAIDED

## (undated) DEVICE — KIT, DISPOSABLES, FOR DX FIBERTAK

## (undated) DEVICE — DRILL BIT, CANNULATED, 5.0MM

## (undated) DEVICE — STRIP, SKIN CLOSURE, STERI STRIP, NON-REINFORCED, 0.5 X 4 IN

## (undated) DEVICE — GLOVE, SURGICAL, PROTEXIS PI W/NEU-THERA, 8.0, PF, LF

## (undated) DEVICE — NEEDLE, HYPODERMIC, REGULAR WALL, REGULAR BEVEL, 20 G X 1.5 IN

## (undated) DEVICE — BUR, ROUND, 4.0MM X 54.0 MM

## (undated) DEVICE — DRESSING, ABDOMINAL, TENDERSORB, 8 X 10 IN, STERILE

## (undated) DEVICE — PADDING, WEBRIL, UNDERCAST, STERILE, 6 IN

## (undated) DEVICE — SUTURE, MONOCRYL, 3-0, 18 IN, PS2, UNDYED

## (undated) DEVICE — COVER, C-ARM W/CLIPS, OEC GE